# Patient Record
Sex: MALE | Race: WHITE | NOT HISPANIC OR LATINO | URBAN - METROPOLITAN AREA
[De-identification: names, ages, dates, MRNs, and addresses within clinical notes are randomized per-mention and may not be internally consistent; named-entity substitution may affect disease eponyms.]

---

## 2017-01-04 ENCOUNTER — EMERGENCY (EMERGENCY)
Facility: HOSPITAL | Age: 42
LOS: 1 days | Discharge: LEFT BEFORE DISCHARGE | End: 2017-01-04
Attending: EMERGENCY MEDICINE

## 2017-01-04 ENCOUNTER — EMERGENCY (EMERGENCY)
Facility: HOSPITAL | Age: 42
LOS: 1 days | Discharge: HOME | End: 2017-01-04
Attending: EMERGENCY MEDICINE | Admitting: EMERGENCY MEDICINE
Payer: COMMERCIAL

## 2017-01-04 VITALS
SYSTOLIC BLOOD PRESSURE: 149 MMHG | RESPIRATION RATE: 20 BRPM | OXYGEN SATURATION: 97 % | DIASTOLIC BLOOD PRESSURE: 95 MMHG | HEART RATE: 115 BPM | HEIGHT: 71 IN | WEIGHT: 298.06 LBS | TEMPERATURE: 99 F

## 2017-01-04 VITALS
HEART RATE: 118 BPM | SYSTOLIC BLOOD PRESSURE: 147 MMHG | RESPIRATION RATE: 22 BRPM | OXYGEN SATURATION: 99 % | DIASTOLIC BLOOD PRESSURE: 81 MMHG

## 2017-01-04 DIAGNOSIS — R06.02 SHORTNESS OF BREATH: ICD-10-CM

## 2017-01-04 DIAGNOSIS — Z53.20 PROCEDURE AND TREATMENT NOT CARRIED OUT BECAUSE OF PATIENT'S DECISION FOR UNSPECIFIED REASONS: ICD-10-CM

## 2017-01-04 DIAGNOSIS — R05 COUGH: ICD-10-CM

## 2017-01-04 PROCEDURE — 99284 EMERGENCY DEPT VISIT MOD MDM: CPT

## 2017-01-05 VITALS — HEART RATE: 106 BPM | TEMPERATURE: 99 F | RESPIRATION RATE: 18 BRPM | OXYGEN SATURATION: 100 %

## 2017-01-05 LAB
ALBUMIN SERPL ELPH-MCNC: 4.8 G/DL — SIGNIFICANT CHANGE UP (ref 3.3–5)
ALP SERPL-CCNC: 84 U/L — SIGNIFICANT CHANGE UP (ref 40–120)
ALT FLD-CCNC: 60 U/L RC — HIGH (ref 10–45)
ANION GAP SERPL CALC-SCNC: 20 MMOL/L — HIGH (ref 5–17)
APTT BLD: 33.4 SEC — SIGNIFICANT CHANGE UP (ref 27.5–37.4)
AST SERPL-CCNC: 36 U/L — SIGNIFICANT CHANGE UP (ref 10–40)
BASOPHILS # BLD AUTO: 0 K/UL — SIGNIFICANT CHANGE UP (ref 0–0.2)
BASOPHILS NFR BLD AUTO: 0.3 % — SIGNIFICANT CHANGE UP (ref 0–2)
BILIRUB SERPL-MCNC: 0.7 MG/DL — SIGNIFICANT CHANGE UP (ref 0.2–1.2)
BUN SERPL-MCNC: 12 MG/DL — SIGNIFICANT CHANGE UP (ref 7–23)
CALCIUM SERPL-MCNC: 9.4 MG/DL — SIGNIFICANT CHANGE UP (ref 8.4–10.5)
CHLORIDE SERPL-SCNC: 97 MMOL/L — SIGNIFICANT CHANGE UP (ref 96–108)
CO2 SERPL-SCNC: 23 MMOL/L — SIGNIFICANT CHANGE UP (ref 22–31)
CREAT SERPL-MCNC: 1.09 MG/DL — SIGNIFICANT CHANGE UP (ref 0.5–1.3)
EOSINOPHIL # BLD AUTO: 0.3 K/UL — SIGNIFICANT CHANGE UP (ref 0–0.5)
EOSINOPHIL NFR BLD AUTO: 3.4 % — SIGNIFICANT CHANGE UP (ref 0–6)
GLUCOSE SERPL-MCNC: 148 MG/DL — HIGH (ref 70–99)
HCT VFR BLD CALC: 44.9 % — SIGNIFICANT CHANGE UP (ref 39–50)
HGB BLD-MCNC: 15 G/DL — SIGNIFICANT CHANGE UP (ref 13–17)
INR BLD: 1.17 RATIO — HIGH (ref 0.88–1.16)
LYMPHOCYTES # BLD AUTO: 1.7 K/UL — SIGNIFICANT CHANGE UP (ref 1–3.3)
LYMPHOCYTES # BLD AUTO: 20 % — SIGNIFICANT CHANGE UP (ref 13–44)
MCHC RBC-ENTMCNC: 31 PG — SIGNIFICANT CHANGE UP (ref 27–34)
MCHC RBC-ENTMCNC: 33.5 GM/DL — SIGNIFICANT CHANGE UP (ref 32–36)
MCV RBC AUTO: 92.7 FL — SIGNIFICANT CHANGE UP (ref 80–100)
MONOCYTES # BLD AUTO: 1.1 K/UL — HIGH (ref 0–0.9)
MONOCYTES NFR BLD AUTO: 12.3 % — SIGNIFICANT CHANGE UP (ref 2–14)
NEUTROPHILS # BLD AUTO: 5.6 K/UL — SIGNIFICANT CHANGE UP (ref 1.8–7.4)
NEUTROPHILS NFR BLD AUTO: 64 % — SIGNIFICANT CHANGE UP (ref 43–77)
PLATELET # BLD AUTO: 191 K/UL — SIGNIFICANT CHANGE UP (ref 150–400)
POTASSIUM SERPL-MCNC: 3.7 MMOL/L — SIGNIFICANT CHANGE UP (ref 3.5–5.3)
POTASSIUM SERPL-SCNC: 3.7 MMOL/L — SIGNIFICANT CHANGE UP (ref 3.5–5.3)
PROT SERPL-MCNC: 8.2 G/DL — SIGNIFICANT CHANGE UP (ref 6–8.3)
PROTHROM AB SERPL-ACNC: 12.8 SEC — SIGNIFICANT CHANGE UP (ref 10–13.1)
RBC # BLD: 4.84 M/UL — SIGNIFICANT CHANGE UP (ref 4.2–5.8)
RBC # FLD: 12.8 % — SIGNIFICANT CHANGE UP (ref 10.3–14.5)
SODIUM SERPL-SCNC: 140 MMOL/L — SIGNIFICANT CHANGE UP (ref 135–145)
WBC # BLD: 8.7 K/UL — SIGNIFICANT CHANGE UP (ref 3.8–10.5)
WBC # FLD AUTO: 8.7 K/UL — SIGNIFICANT CHANGE UP (ref 3.8–10.5)

## 2017-01-05 PROCEDURE — 80053 COMPREHEN METABOLIC PANEL: CPT

## 2017-01-05 PROCEDURE — 94640 AIRWAY INHALATION TREATMENT: CPT

## 2017-01-05 PROCEDURE — 96374 THER/PROPH/DIAG INJ IV PUSH: CPT

## 2017-01-05 PROCEDURE — 85027 COMPLETE CBC AUTOMATED: CPT

## 2017-01-05 PROCEDURE — 85610 PROTHROMBIN TIME: CPT

## 2017-01-05 PROCEDURE — 71046 X-RAY EXAM CHEST 2 VIEWS: CPT

## 2017-01-05 PROCEDURE — 99284 EMERGENCY DEPT VISIT MOD MDM: CPT | Mod: 25

## 2017-01-05 PROCEDURE — 85730 THROMBOPLASTIN TIME PARTIAL: CPT

## 2017-01-05 PROCEDURE — 71020: CPT | Mod: 26

## 2017-01-05 RX ORDER — ALBUTEROL 90 UG/1
2 AEROSOL, METERED ORAL
Qty: 1 | Refills: 0
Start: 2017-01-05 | End: 2017-02-04

## 2017-01-05 RX ORDER — SODIUM CHLORIDE 9 MG/ML
1000 INJECTION INTRAMUSCULAR; INTRAVENOUS; SUBCUTANEOUS ONCE
Qty: 0 | Refills: 0 | Status: COMPLETED | OUTPATIENT
Start: 2017-01-05 | End: 2017-01-05

## 2017-01-05 RX ORDER — ACETAMINOPHEN 500 MG
975 TABLET ORAL ONCE
Qty: 0 | Refills: 0 | Status: COMPLETED | OUTPATIENT
Start: 2017-01-05 | End: 2017-01-05

## 2017-01-05 RX ORDER — IPRATROPIUM/ALBUTEROL SULFATE 18-103MCG
3 AEROSOL WITH ADAPTER (GRAM) INHALATION ONCE
Qty: 0 | Refills: 0 | Status: COMPLETED | OUTPATIENT
Start: 2017-01-05 | End: 2017-01-05

## 2017-01-05 RX ORDER — IPRATROPIUM/ALBUTEROL SULFATE 18-103MCG
3 AEROSOL WITH ADAPTER (GRAM) INHALATION
Qty: 30 | Refills: 0
Start: 2017-01-05 | End: 2017-02-04

## 2017-01-05 RX ADMIN — Medication 125 MILLIGRAM(S): at 02:44

## 2017-01-05 RX ADMIN — Medication 3 MILLILITER(S): at 02:53

## 2017-01-05 RX ADMIN — Medication 975 MILLIGRAM(S): at 02:44

## 2017-01-05 RX ADMIN — SODIUM CHLORIDE 1000 MILLILITER(S): 9 INJECTION INTRAMUSCULAR; INTRAVENOUS; SUBCUTANEOUS at 02:54

## 2017-01-05 NOTE — ED PROVIDER NOTE - ATTENDING CONTRIBUTION TO CARE
I was physically present for the E/M service provided. I agree with above history, physical, and plan which I have reviewed and edited where appropriate. I was physically present for the key portions of the service provided.    No PNA on CXR  Improved with Duonebs and steroids  comfortable respirations on RA, lungs CTA after Tx  f/u PCP

## 2017-01-05 NOTE — ED PROVIDER NOTE - OBJECTIVE STATEMENT
41M hx of asthma p/w chest tightness and sob typical of prior asthma attacks. Pt has had 4 days of non-productive cough and subjective fever. Used rescue inhaler greater than 1x / 2 hr without relief so presented to ED. Denies recent travel,

## 2017-01-05 NOTE — ED PROVIDER NOTE - RESPIRATORY, MLM
prolonged expiratory phase, LLL wheeze; RLL inspiratory and expiratory wheeze. no increased wob, no accessory muscle use, no stridor.

## 2017-01-05 NOTE — ED PROVIDER NOTE - PROGRESS NOTE DETAILS
Reassess: pt w/o wheeze. ambulating without difficulty. no increased wob. Feels and appears well. No complaints at present. Eager to leave. Stable for discharge.

## 2017-01-05 NOTE — ED PROVIDER NOTE - CARE PLAN
Principal Discharge DX:	Asthma exacerbation  Instructions for follow-up, activity and diet:	Follow up with your PMD within 48-72 hours.  Rest, increase fluids. Take Prednisone 50mg daily for 5 days, Albuterol inhaler 2 inhalations every 4-6 hours as needed. Take all of your other medications as previously prescribed. Any worsening wheezing, shortness of breath, chest pain, fever, chills return to the ED

## 2017-01-05 NOTE — ED PROVIDER NOTE - PLAN OF CARE
Follow up with your PMD within 48-72 hours.  Rest, increase fluids. Take Prednisone 50mg daily for 5 days, Albuterol inhaler 2 inhalations every 4-6 hours as needed. Take all of your other medications as previously prescribed. Any worsening wheezing, shortness of breath, chest pain, fever, chills return to the ED

## 2019-06-14 ENCOUNTER — EMERGENCY (EMERGENCY)
Facility: HOSPITAL | Age: 44
LOS: 1 days | Discharge: HOME | End: 2019-06-14
Attending: EMERGENCY MEDICINE
Payer: COMMERCIAL

## 2019-06-14 VITALS
TEMPERATURE: 99 F | DIASTOLIC BLOOD PRESSURE: 87 MMHG | HEART RATE: 86 BPM | OXYGEN SATURATION: 96 % | SYSTOLIC BLOOD PRESSURE: 131 MMHG

## 2019-06-14 VITALS
HEIGHT: 71 IN | TEMPERATURE: 98 F | HEART RATE: 96 BPM | WEIGHT: 296.08 LBS | SYSTOLIC BLOOD PRESSURE: 124 MMHG | OXYGEN SATURATION: 96 % | RESPIRATION RATE: 20 BRPM | DIASTOLIC BLOOD PRESSURE: 65 MMHG

## 2019-06-14 LAB
ALBUMIN SERPL ELPH-MCNC: 4.2 G/DL — SIGNIFICANT CHANGE UP (ref 3.3–5)
ALP SERPL-CCNC: 87 U/L — SIGNIFICANT CHANGE UP (ref 40–120)
ALT FLD-CCNC: 39 U/L — SIGNIFICANT CHANGE UP (ref 10–45)
ANION GAP SERPL CALC-SCNC: 11 MMOL/L — SIGNIFICANT CHANGE UP (ref 5–17)
APPEARANCE UR: ABNORMAL
APTT BLD: 28.2 SEC — SIGNIFICANT CHANGE UP (ref 27.5–36.3)
AST SERPL-CCNC: 24 U/L — SIGNIFICANT CHANGE UP (ref 10–40)
BACTERIA # UR AUTO: ABNORMAL
BASOPHILS # BLD AUTO: 0 K/UL — SIGNIFICANT CHANGE UP (ref 0–0.2)
BASOPHILS NFR BLD AUTO: 0.3 % — SIGNIFICANT CHANGE UP (ref 0–2)
BILIRUB SERPL-MCNC: 0.3 MG/DL — SIGNIFICANT CHANGE UP (ref 0.2–1.2)
BILIRUB UR-MCNC: NEGATIVE — SIGNIFICANT CHANGE UP
BUN SERPL-MCNC: 12 MG/DL — SIGNIFICANT CHANGE UP (ref 7–23)
C TRACH RRNA SPEC QL NAA+PROBE: SIGNIFICANT CHANGE UP
CALCIUM SERPL-MCNC: 9.4 MG/DL — SIGNIFICANT CHANGE UP (ref 8.4–10.5)
CHLORIDE SERPL-SCNC: 101 MMOL/L — SIGNIFICANT CHANGE UP (ref 96–108)
CO2 SERPL-SCNC: 24 MMOL/L — SIGNIFICANT CHANGE UP (ref 22–31)
COLOR SPEC: ABNORMAL
CREAT SERPL-MCNC: 0.85 MG/DL — SIGNIFICANT CHANGE UP (ref 0.5–1.3)
DIFF PNL FLD: ABNORMAL
EOSINOPHIL # BLD AUTO: 0.2 K/UL — SIGNIFICANT CHANGE UP (ref 0–0.5)
EOSINOPHIL NFR BLD AUTO: 1.2 % — SIGNIFICANT CHANGE UP (ref 0–6)
EPI CELLS # UR: SIGNIFICANT CHANGE UP
GAS PNL BLDV: SIGNIFICANT CHANGE UP
GLUCOSE SERPL-MCNC: 184 MG/DL — HIGH (ref 70–99)
GLUCOSE UR QL: NEGATIVE — SIGNIFICANT CHANGE UP
HCT VFR BLD CALC: 44.1 % — SIGNIFICANT CHANGE UP (ref 39–50)
HGB BLD-MCNC: 14.8 G/DL — SIGNIFICANT CHANGE UP (ref 13–17)
INR BLD: 1 RATIO — SIGNIFICANT CHANGE UP (ref 0.88–1.16)
KETONES UR-MCNC: NEGATIVE — SIGNIFICANT CHANGE UP
LEUKOCYTE ESTERASE UR-ACNC: ABNORMAL
LYMPHOCYTES # BLD AUTO: 1.3 K/UL — SIGNIFICANT CHANGE UP (ref 1–3.3)
LYMPHOCYTES # BLD AUTO: 9.1 % — LOW (ref 13–44)
MCHC RBC-ENTMCNC: 30.6 PG — SIGNIFICANT CHANGE UP (ref 27–34)
MCHC RBC-ENTMCNC: 33.7 GM/DL — SIGNIFICANT CHANGE UP (ref 32–36)
MCV RBC AUTO: 91 FL — SIGNIFICANT CHANGE UP (ref 80–100)
MONOCYTES # BLD AUTO: 0.6 K/UL — SIGNIFICANT CHANGE UP (ref 0–0.9)
MONOCYTES NFR BLD AUTO: 4.5 % — SIGNIFICANT CHANGE UP (ref 2–14)
N GONORRHOEA RRNA SPEC QL NAA+PROBE: SIGNIFICANT CHANGE UP
NEUTROPHILS # BLD AUTO: 12.3 K/UL — HIGH (ref 1.8–7.4)
NEUTROPHILS NFR BLD AUTO: 85 % — HIGH (ref 43–77)
NITRITE UR-MCNC: NEGATIVE — SIGNIFICANT CHANGE UP
PH UR: 5.5 — SIGNIFICANT CHANGE UP (ref 5–8)
PLATELET # BLD AUTO: 214 K/UL — SIGNIFICANT CHANGE UP (ref 150–400)
POTASSIUM SERPL-MCNC: 4.2 MMOL/L — SIGNIFICANT CHANGE UP (ref 3.5–5.3)
POTASSIUM SERPL-SCNC: 4.2 MMOL/L — SIGNIFICANT CHANGE UP (ref 3.5–5.3)
PROT SERPL-MCNC: 7.5 G/DL — SIGNIFICANT CHANGE UP (ref 6–8.3)
PROT UR-MCNC: 100 — SIGNIFICANT CHANGE UP
PROTHROM AB SERPL-ACNC: 11.5 SEC — SIGNIFICANT CHANGE UP (ref 10–12.9)
RBC # BLD: 4.85 M/UL — SIGNIFICANT CHANGE UP (ref 4.2–5.8)
RBC # FLD: 12.4 % — SIGNIFICANT CHANGE UP (ref 10.3–14.5)
RBC CASTS # UR COMP ASSIST: >50 /HPF — SIGNIFICANT CHANGE UP (ref 0–4)
SODIUM SERPL-SCNC: 136 MMOL/L — SIGNIFICANT CHANGE UP (ref 135–145)
SP GR SPEC: 1.02 — SIGNIFICANT CHANGE UP (ref 1.01–1.02)
SPECIMEN SOURCE: SIGNIFICANT CHANGE UP
UROBILINOGEN FLD QL: NEGATIVE — SIGNIFICANT CHANGE UP
WBC # BLD: 14.4 K/UL — HIGH (ref 3.8–10.5)
WBC # FLD AUTO: 14.4 K/UL — HIGH (ref 3.8–10.5)
WBC UR QL: >50

## 2019-06-14 PROCEDURE — 96372 THER/PROPH/DIAG INJ SC/IM: CPT | Mod: XU

## 2019-06-14 PROCEDURE — 82435 ASSAY OF BLOOD CHLORIDE: CPT

## 2019-06-14 PROCEDURE — 99284 EMERGENCY DEPT VISIT MOD MDM: CPT | Mod: 25

## 2019-06-14 PROCEDURE — 87186 SC STD MICRODIL/AGAR DIL: CPT

## 2019-06-14 PROCEDURE — 76870 US EXAM SCROTUM: CPT | Mod: 26

## 2019-06-14 PROCEDURE — 76870 US EXAM SCROTUM: CPT

## 2019-06-14 PROCEDURE — 80053 COMPREHEN METABOLIC PANEL: CPT

## 2019-06-14 PROCEDURE — 74176 CT ABD & PELVIS W/O CONTRAST: CPT

## 2019-06-14 PROCEDURE — 85610 PROTHROMBIN TIME: CPT

## 2019-06-14 PROCEDURE — 84132 ASSAY OF SERUM POTASSIUM: CPT

## 2019-06-14 PROCEDURE — 99285 EMERGENCY DEPT VISIT HI MDM: CPT

## 2019-06-14 PROCEDURE — 87591 N.GONORRHOEAE DNA AMP PROB: CPT

## 2019-06-14 PROCEDURE — 74176 CT ABD & PELVIS W/O CONTRAST: CPT | Mod: 26

## 2019-06-14 PROCEDURE — 93976 VASCULAR STUDY: CPT

## 2019-06-14 PROCEDURE — 87491 CHLMYD TRACH DNA AMP PROBE: CPT

## 2019-06-14 PROCEDURE — 83605 ASSAY OF LACTIC ACID: CPT

## 2019-06-14 PROCEDURE — 81001 URINALYSIS AUTO W/SCOPE: CPT

## 2019-06-14 PROCEDURE — 93975 VASCULAR STUDY: CPT

## 2019-06-14 PROCEDURE — 96374 THER/PROPH/DIAG INJ IV PUSH: CPT

## 2019-06-14 PROCEDURE — 85730 THROMBOPLASTIN TIME PARTIAL: CPT

## 2019-06-14 PROCEDURE — 82803 BLOOD GASES ANY COMBINATION: CPT

## 2019-06-14 PROCEDURE — 87086 URINE CULTURE/COLONY COUNT: CPT

## 2019-06-14 PROCEDURE — 96375 TX/PRO/DX INJ NEW DRUG ADDON: CPT

## 2019-06-14 PROCEDURE — 82947 ASSAY GLUCOSE BLOOD QUANT: CPT

## 2019-06-14 PROCEDURE — 84295 ASSAY OF SERUM SODIUM: CPT

## 2019-06-14 PROCEDURE — 85014 HEMATOCRIT: CPT

## 2019-06-14 PROCEDURE — 93976 VASCULAR STUDY: CPT | Mod: 26

## 2019-06-14 PROCEDURE — 85027 COMPLETE CBC AUTOMATED: CPT

## 2019-06-14 PROCEDURE — 82330 ASSAY OF CALCIUM: CPT

## 2019-06-14 RX ORDER — MORPHINE SULFATE 50 MG/1
4 CAPSULE, EXTENDED RELEASE ORAL ONCE
Refills: 0 | Status: DISCONTINUED | OUTPATIENT
Start: 2019-06-14 | End: 2019-06-14

## 2019-06-14 RX ORDER — KETOROLAC TROMETHAMINE 30 MG/ML
15 SYRINGE (ML) INJECTION ONCE
Refills: 0 | Status: DISCONTINUED | OUTPATIENT
Start: 2019-06-14 | End: 2019-06-14

## 2019-06-14 RX ORDER — SODIUM CHLORIDE 9 MG/ML
1000 INJECTION INTRAMUSCULAR; INTRAVENOUS; SUBCUTANEOUS ONCE
Refills: 0 | Status: COMPLETED | OUTPATIENT
Start: 2019-06-14 | End: 2019-06-14

## 2019-06-14 RX ORDER — CEFTRIAXONE 500 MG/1
250 INJECTION, POWDER, FOR SOLUTION INTRAMUSCULAR; INTRAVENOUS ONCE
Refills: 0 | Status: COMPLETED | OUTPATIENT
Start: 2019-06-14 | End: 2019-06-14

## 2019-06-14 RX ORDER — AZITHROMYCIN 500 MG/1
1 TABLET, FILM COATED ORAL ONCE
Refills: 0 | Status: DISCONTINUED | OUTPATIENT
Start: 2019-06-14 | End: 2019-06-14

## 2019-06-14 RX ORDER — AZITHROMYCIN 500 MG/1
1000 TABLET, FILM COATED ORAL ONCE
Refills: 0 | Status: COMPLETED | OUTPATIENT
Start: 2019-06-14 | End: 2019-06-14

## 2019-06-14 RX ORDER — ACETAMINOPHEN 500 MG
975 TABLET ORAL ONCE
Refills: 0 | Status: COMPLETED | OUTPATIENT
Start: 2019-06-14 | End: 2019-06-14

## 2019-06-14 RX ORDER — CIPROFLOXACIN LACTATE 400MG/40ML
1 VIAL (ML) INTRAVENOUS
Qty: 9 | Refills: 0
Start: 2019-06-14 | End: 2019-06-22

## 2019-06-14 RX ADMIN — Medication 15 MILLIGRAM(S): at 11:32

## 2019-06-14 RX ADMIN — AZITHROMYCIN 1000 MILLIGRAM(S): 500 TABLET, FILM COATED ORAL at 12:20

## 2019-06-14 RX ADMIN — SODIUM CHLORIDE 1000 MILLILITER(S): 9 INJECTION INTRAMUSCULAR; INTRAVENOUS; SUBCUTANEOUS at 09:48

## 2019-06-14 RX ADMIN — Medication 975 MILLIGRAM(S): at 11:33

## 2019-06-14 RX ADMIN — MORPHINE SULFATE 4 MILLIGRAM(S): 50 CAPSULE, EXTENDED RELEASE ORAL at 09:48

## 2019-06-14 RX ADMIN — CEFTRIAXONE 250 MILLIGRAM(S): 500 INJECTION, POWDER, FOR SOLUTION INTRAMUSCULAR; INTRAVENOUS at 12:20

## 2019-06-14 NOTE — ED PROVIDER NOTE - NSFOLLOWUPINSTRUCTIONS_ED_ALL_ED_FT
1. Please follow up with a urologist by calling the listed #. Call today to make earliest available appointment.   2. Take antibiotics as directed for epididymitis as discussed. You received 1 dose today and can start the next dose tomorrow.    3. Take 600mg Motrin (also called Advil or Ibuprofen) every 6 hours as needed for pain/discomfort/swelling. You can take this with Tylenol 650 mg (also called acetaminophen) every 6 hours.     Don't use more than 3500mg of Tylenol in any 24-hour period. Make sure your other prescription/over-the-counter medications don't contain any Tylenol so you don't take too much.     If you have any stomach discomfort while taking Motrin, you can use TUMS or Pepcid or Zantac (these can all be bought without a prescription).   4. Please return to the ED should you have any new or worsening symptoms or have any new concerns.   5. Read all attached.

## 2019-06-14 NOTE — ED PROVIDER NOTE - OBJECTIVE STATEMENT
44 y/o male with PMH asthma p/w R testicular pain. Pain woke him up at 3 am, is sharp, radiates to R lower back. Worse with ambulation. +Dysuria, urinary urgency. +Dark urine today. Pain 10/10. Denies: penile discharge, fevers, chills, n/v/d, HA, dizziness, CP, SOB, trauma, scrotal swelling/rash

## 2019-06-14 NOTE — ED ADULT NURSE NOTE - DISCHARGE DATE/TIME
----- Message from Nicky Gamino sent at 4/3/2019  8:58 AM CDT -----  .Type:  Patient Returning Call    Who Called:pt  Who Left Message for Patient:francois  Does the patient know what this is regarding?:working in 4/9 as she can only do mornings  Would the patient rather a call back or a response via [facility]? call  Best Call Back Number:[phone]   Additional Information: please call until answers, phone acts up sometimes      
14-Jun-2019 12:30

## 2019-06-14 NOTE — ED PROVIDER NOTE - CLINICAL SUMMARY MEDICAL DECISION MAKING FREE TEXT BOX
GAVINO Pruitt MD: 44 y/o male with asthma p/w c/o R testicular pain since 3am, sudden severe, worse with ambulation, +dysuria and urinary urgency. R testicle ++ttp and high-riding. Concern for testicular torsion vs. epididymitis vs. kidney stone. Plan: STAT scrotal US, basic labs, u/a, ucx, pain control. If no evidence of torsion on US, consider CTAP to eval for stone

## 2019-06-14 NOTE — ED PROVIDER NOTE - PROGRESS NOTE DETAILS
Patti Barney DO: reviewed results with patient. Patient monogamous but last STI check 1 year ago. Will treat and obtain g/c results. If positive, girlfriend at bedside aware she should be tested. Will treat with Levoquin x 10 days. Provided with copy of results. Stable for outpatient follow up.

## 2019-06-14 NOTE — ED PROVIDER NOTE - NSFOLLOWUPCLINICS_GEN_ALL_ED_FT
[facility] - Urology  Urology  [address] & 4th [place] [address]  Phone: [phone]  Fax:   Follow Up Time:

## 2019-06-14 NOTE — ED ADULT NURSE NOTE - OBJECTIVE STATEMENT
43 year old male with pmh of fentanyl presenting to ed with sudden onset of suprapubic and right testicular pain at 3am that woke him up out of sleep. patient denies any changes in bowel/bladder, verbalized noticing his urine is darker in color than usual. denies sob/dyspnea/cp/fever/chills. respirations even unlabored

## 2019-06-16 LAB
-  AMIKACIN: SIGNIFICANT CHANGE UP
-  AMPICILLIN/SULBACTAM: SIGNIFICANT CHANGE UP
-  AMPICILLIN: SIGNIFICANT CHANGE UP
-  AZTREONAM: SIGNIFICANT CHANGE UP
-  CEFEPIME: SIGNIFICANT CHANGE UP
-  CEFOXITIN: SIGNIFICANT CHANGE UP
-  CEFTRIAXONE: SIGNIFICANT CHANGE UP
-  CIPROFLOXACIN: SIGNIFICANT CHANGE UP
-  ERTAPENEM: SIGNIFICANT CHANGE UP
-  GENTAMICIN: SIGNIFICANT CHANGE UP
-  IMIPENEM: SIGNIFICANT CHANGE UP
-  LEVOFLOXACIN: SIGNIFICANT CHANGE UP
-  MEROPENEM: SIGNIFICANT CHANGE UP
-  NITROFURANTOIN: SIGNIFICANT CHANGE UP
-  PIPERACILLIN/TAZOBACTAM: SIGNIFICANT CHANGE UP
-  TIGECYCLINE: SIGNIFICANT CHANGE UP
-  TOBRAMYCIN: SIGNIFICANT CHANGE UP
-  TRIMETHOPRIM/SULFAMETHOXAZOLE: SIGNIFICANT CHANGE UP
CULTURE RESULTS: SIGNIFICANT CHANGE UP
METHOD TYPE: SIGNIFICANT CHANGE UP
ORGANISM # SPEC MICROSCOPIC CNT: SIGNIFICANT CHANGE UP
ORGANISM # SPEC MICROSCOPIC CNT: SIGNIFICANT CHANGE UP
SPECIMEN SOURCE: SIGNIFICANT CHANGE UP

## 2020-03-30 ENCOUNTER — EMERGENCY (EMERGENCY)
Facility: HOSPITAL | Age: 45
LOS: 1 days | Discharge: HOME | End: 2020-03-30
Attending: EMERGENCY MEDICINE
Payer: COMMERCIAL

## 2020-03-30 VITALS
RESPIRATION RATE: 24 BRPM | HEART RATE: 93 BPM | DIASTOLIC BLOOD PRESSURE: 66 MMHG | TEMPERATURE: 100 F | SYSTOLIC BLOOD PRESSURE: 103 MMHG | OXYGEN SATURATION: 94 %

## 2020-03-30 VITALS
WEIGHT: 279.99 LBS | TEMPERATURE: 103 F | OXYGEN SATURATION: 95 % | DIASTOLIC BLOOD PRESSURE: 74 MMHG | SYSTOLIC BLOOD PRESSURE: 110 MMHG | RESPIRATION RATE: 18 BRPM | HEIGHT: 69 IN | HEART RATE: 126 BPM

## 2020-03-30 PROCEDURE — 99284 EMERGENCY DEPT VISIT MOD MDM: CPT

## 2020-03-30 PROCEDURE — 71045 X-RAY EXAM CHEST 1 VIEW: CPT | Mod: 26

## 2020-03-30 PROCEDURE — 99283 EMERGENCY DEPT VISIT LOW MDM: CPT | Mod: 25

## 2020-03-30 PROCEDURE — 71045 X-RAY EXAM CHEST 1 VIEW: CPT

## 2020-03-30 PROCEDURE — 93005 ELECTROCARDIOGRAM TRACING: CPT

## 2020-03-30 PROCEDURE — 93010 ELECTROCARDIOGRAM REPORT: CPT

## 2020-03-30 RX ORDER — ACETAMINOPHEN 500 MG
975 TABLET ORAL ONCE
Refills: 0 | Status: COMPLETED | OUTPATIENT
Start: 2020-03-30 | End: 2020-03-30

## 2020-03-30 RX ADMIN — Medication 975 MILLIGRAM(S): at 17:51

## 2020-03-30 NOTE — ED ADULT NURSE NOTE - OBJECTIVE STATEMENT
The pt is a 45 y/o M no PMH c/o fever, cough, SOB x 1 week. Pt has been taking PM Tylenol everyday. Upon assessment, pt is AO x 4, speaking coherently in full sentences, skin warm, dry and pink. Pt resting comfortably, will continue to monitor.

## 2020-03-30 NOTE — ED PROVIDER NOTE - NSFOLLOWUPINSTRUCTIONS_ED_ALL_ED_FT
You were seen and evaluated in the Emergency Department for your viral upper respiratory-like symptoms. You were evaluated clinically and with laboratory studies - at this time your symptoms, history, and presentation do not suggest that you are at high-risk for Corona Virus (COVID). We have sent off a respiratory viral panel for further evaluation of your illness. You can call back for your results by [phone] of your viral panel; please see the attached COVID information packet for management of your symptoms as well as numbers to call for your COVID test results, and more information on the next steps including your self-quarantine measures.     At this time your clinical evaluation and history do not demonstrate any acute, life-threatening medical conditions warranting emergent treatment. However, we strongly recommend you follow up with one of our Primary Care Doctors (or your own) for further evaluation of your symptoms by calling the following number to make an appointment:    [facility] Internal Medicine  General Internal Medicine  [address] [place] [address]  Phone: [phone]    Continue to maintain oral hydration, with plenty of fluids; take Tylenol and Motrin, every 8 hours with food (following the instructions on the medication insert information sheet for dosing) for fever control.     Should you develop new or worsening chest pain, shortness of breath, abdominal pain, fevers, chills, nausea, vomiting, diarrhea, or constipation - please return to the ED for immediate evaluation.     We also strongly encourage you make an appointment with your Primary Care Physician for a comprehensive evaluation of your health.

## 2020-03-30 NOTE — ED PROVIDER NOTE - PATIENT PORTAL LINK FT
You can access the FollowMyHealth Patient Portal offered by [facility] by registering at the following website: [email or web address]. By joining [facility]’s FollowMyHealth portal, you will also be able to view your health information using other applications (apps) compatible with our system.

## 2020-03-30 NOTE — ED PROVIDER NOTE - OBJECTIVE STATEMENT
44 y.o. male history of asthma (never been intubated, not on any prolonged agents) coming in on day 5 of fevers, chills, cough ()dry) and SoB.  No one else is sick at home.  Has been self isolating.  Taking Tylenol at home for relief of fever but has not taken anything today.  No belly pain but some decreased PO.  No vomiting, no diarrhea.  No chest pain.  Exertion makes his symptoms worse, rest makes the SoB better.

## 2020-03-30 NOTE — ED PROVIDER NOTE - NSFOLLOWUPCLINICS_GEN_ALL_ED_FT
[facility] General Internal Medicine  General Internal Medicine  [address] [id number]  Phone: [phone]  Fax:   Follow Up Time:

## 2020-03-30 NOTE — ED PROVIDER NOTE - ATTENDING CONTRIBUTION TO CARE
43yo male pmh mild intermittent asthma no intubations p/w fevers x 3d, myalgias and joint pains, weakness, cough, rhinorrhea x 8d. Daughter with cough x 2d. Denies Tylenol or ibuprofen today. Denies rash, recent travel, vomiting, confusion, syncope. Pt appears tired, AAOx4, able to sit on his own, diminished breath sounds bilaterally, no wheezes or ronchi, not tachypneic. Abd soft, HR tachycardic no murmur, regular rate, no calf tenderness or LE swelling, moving all extremities. Tylenol, CXR. EKG sinus tachycardic non ischemic. Oral hydration. Reassess.

## 2020-04-01 ENCOUNTER — INPATIENT (INPATIENT)
Facility: HOSPITAL | Age: 45
LOS: 7 days | Discharge: HOME | DRG: 177 | End: 2020-04-09
Attending: INTERNAL MEDICINE | Admitting: HOSPITALIST
Payer: COMMERCIAL

## 2020-04-01 VITALS
WEIGHT: 279.99 LBS | DIASTOLIC BLOOD PRESSURE: 81 MMHG | HEART RATE: 106 BPM | HEIGHT: 69 IN | SYSTOLIC BLOOD PRESSURE: 145 MMHG | OXYGEN SATURATION: 93 % | TEMPERATURE: 101 F | RESPIRATION RATE: 25 BRPM

## 2020-04-01 DIAGNOSIS — Z29.9 ENCOUNTER FOR PROPHYLACTIC MEASURES, UNSPECIFIED: ICD-10-CM

## 2020-04-01 DIAGNOSIS — R68.89 OTHER GENERAL SYMPTOMS AND SIGNS: ICD-10-CM

## 2020-04-01 DIAGNOSIS — J12.9 VIRAL PNEUMONIA, UNSPECIFIED: ICD-10-CM

## 2020-04-01 DIAGNOSIS — J45.40 MODERATE PERSISTENT ASTHMA, UNCOMPLICATED: ICD-10-CM

## 2020-04-01 LAB
ALBUMIN SERPL ELPH-MCNC: 4.1 G/DL — SIGNIFICANT CHANGE UP (ref 3.3–5)
ALP SERPL-CCNC: 59 U/L — SIGNIFICANT CHANGE UP (ref 40–120)
ALT FLD-CCNC: 34 U/L — SIGNIFICANT CHANGE UP (ref 10–45)
ANION GAP SERPL CALC-SCNC: 14 MMOL/L — SIGNIFICANT CHANGE UP (ref 5–17)
APTT BLD: 29.3 SEC — SIGNIFICANT CHANGE UP (ref 27.5–36.3)
AST SERPL-CCNC: 51 U/L — HIGH (ref 10–40)
BASE EXCESS BLDV CALC-SCNC: 1.8 MMOL/L — SIGNIFICANT CHANGE UP (ref -2–2)
BASOPHILS # BLD AUTO: 0.01 K/UL — SIGNIFICANT CHANGE UP (ref 0–0.2)
BASOPHILS NFR BLD AUTO: 0.2 % — SIGNIFICANT CHANGE UP (ref 0–2)
BILIRUB SERPL-MCNC: 0.7 MG/DL — SIGNIFICANT CHANGE UP (ref 0.2–1.2)
BUN SERPL-MCNC: 11 MG/DL — SIGNIFICANT CHANGE UP (ref 7–23)
CA-I SERPL-SCNC: SIGNIFICANT CHANGE UP MMOL/L (ref 1.12–1.3)
CALCIUM SERPL-MCNC: 8.9 MG/DL — SIGNIFICANT CHANGE UP (ref 8.4–10.5)
CHLORIDE BLDV-SCNC: 98 MMOL/L — SIGNIFICANT CHANGE UP (ref 96–108)
CHLORIDE SERPL-SCNC: 94 MMOL/L — LOW (ref 96–108)
CO2 BLDV-SCNC: 29 MMOL/L — SIGNIFICANT CHANGE UP (ref 22–30)
CO2 SERPL-SCNC: 25 MMOL/L — SIGNIFICANT CHANGE UP (ref 22–31)
CREAT SERPL-MCNC: 0.79 MG/DL — SIGNIFICANT CHANGE UP (ref 0.5–1.3)
CRP SERPL-MCNC: 4.6 MG/DL — HIGH (ref 0–0.4)
D DIMER BLD IA.RAPID-MCNC: 250 NG/ML DDU — HIGH
EOSINOPHIL # BLD AUTO: 0 K/UL — SIGNIFICANT CHANGE UP (ref 0–0.5)
EOSINOPHIL NFR BLD AUTO: 0 % — SIGNIFICANT CHANGE UP (ref 0–6)
ERYTHROCYTE [SEDIMENTATION RATE] IN BLOOD: 46 MM/HR — HIGH (ref 0–15)
FERRITIN SERPL-MCNC: 1282 NG/ML — HIGH (ref 30–400)
GAS PNL BLDV: SIGNIFICANT CHANGE UP
GAS PNL BLDV: SIGNIFICANT CHANGE UP
GAS PNL BLDV: SIGNIFICANT CHANGE UP MMOL/L (ref 135–145)
GLUCOSE BLDV-MCNC: 88 MG/DL — SIGNIFICANT CHANGE UP (ref 70–99)
GLUCOSE SERPL-MCNC: 91 MG/DL — SIGNIFICANT CHANGE UP (ref 70–99)
HCO3 BLDV-SCNC: 27 MMOL/L — SIGNIFICANT CHANGE UP (ref 21–29)
HCT VFR BLD CALC: 43.7 % — SIGNIFICANT CHANGE UP (ref 39–50)
HCT VFR BLDA CALC: 55 % — HIGH (ref 39–50)
HGB BLD CALC-MCNC: 18.1 G/DL — HIGH (ref 13–17)
HGB BLD-MCNC: 14.4 G/DL — SIGNIFICANT CHANGE UP (ref 13–17)
HOROWITZ INDEX BLDV+IHG-RTO: SIGNIFICANT CHANGE UP
IMM GRANULOCYTES NFR BLD AUTO: 0.5 % — SIGNIFICANT CHANGE UP (ref 0–1.5)
INR BLD: 1.16 RATIO — SIGNIFICANT CHANGE UP (ref 0.88–1.16)
LACTATE BLDV-MCNC: 1.7 MMOL/L — SIGNIFICANT CHANGE UP (ref 0.7–2)
LDH SERPL L TO P-CCNC: 625 U/L — HIGH (ref 50–242)
LYMPHOCYTES # BLD AUTO: 0.67 K/UL — LOW (ref 1–3.3)
LYMPHOCYTES # BLD AUTO: 15.7 % — SIGNIFICANT CHANGE UP (ref 13–44)
MCHC RBC-ENTMCNC: 29 PG — SIGNIFICANT CHANGE UP (ref 27–34)
MCHC RBC-ENTMCNC: 33 GM/DL — SIGNIFICANT CHANGE UP (ref 32–36)
MCV RBC AUTO: 87.9 FL — SIGNIFICANT CHANGE UP (ref 80–100)
MONOCYTES # BLD AUTO: 0.46 K/UL — SIGNIFICANT CHANGE UP (ref 0–0.9)
MONOCYTES NFR BLD AUTO: 10.7 % — SIGNIFICANT CHANGE UP (ref 2–14)
NEUTROPHILS # BLD AUTO: 3.12 K/UL — SIGNIFICANT CHANGE UP (ref 1.8–7.4)
NEUTROPHILS NFR BLD AUTO: 72.9 % — SIGNIFICANT CHANGE UP (ref 43–77)
NRBC # BLD: 0 /100 WBCS — SIGNIFICANT CHANGE UP (ref 0–0)
PCO2 BLDV: 48 MMHG — SIGNIFICANT CHANGE UP (ref 35–50)
PH BLDV: 7.38 — SIGNIFICANT CHANGE UP (ref 7.35–7.45)
PLATELET # BLD AUTO: 145 K/UL — LOW (ref 150–400)
PO2 BLDV: 30 MMHG — SIGNIFICANT CHANGE UP (ref 25–45)
POTASSIUM BLDV-SCNC: SIGNIFICANT CHANGE UP MMOL/L (ref 3.5–5.3)
POTASSIUM SERPL-MCNC: 4.7 MMOL/L — SIGNIFICANT CHANGE UP (ref 3.5–5.3)
POTASSIUM SERPL-SCNC: 4.7 MMOL/L — SIGNIFICANT CHANGE UP (ref 3.5–5.3)
PROCALCITONIN SERPL-MCNC: 0.13 NG/ML — HIGH (ref 0.02–0.1)
PROT SERPL-MCNC: 7.7 G/DL — SIGNIFICANT CHANGE UP (ref 6–8.3)
PROTHROM AB SERPL-ACNC: 13.4 SEC — HIGH (ref 10–12.9)
RBC # BLD: 4.97 M/UL — SIGNIFICANT CHANGE UP (ref 4.2–5.8)
RBC # FLD: 12.6 % — SIGNIFICANT CHANGE UP (ref 10.3–14.5)
SAO2 % BLDV: 53 % — LOW (ref 67–88)
SODIUM SERPL-SCNC: 133 MMOL/L — LOW (ref 135–145)
TROPONIN T, HIGH SENSITIVITY RESULT: 10 NG/L — SIGNIFICANT CHANGE UP (ref 0–51)
WBC # BLD: 4.28 K/UL — SIGNIFICANT CHANGE UP (ref 3.8–10.5)
WBC # FLD AUTO: 4.28 K/UL — SIGNIFICANT CHANGE UP (ref 3.8–10.5)

## 2020-04-01 PROCEDURE — 71045 X-RAY EXAM CHEST 1 VIEW: CPT | Mod: 26

## 2020-04-01 PROCEDURE — 99285 EMERGENCY DEPT VISIT HI MDM: CPT

## 2020-04-01 PROCEDURE — 99223 1ST HOSP IP/OBS HIGH 75: CPT

## 2020-04-01 PROCEDURE — 93010 ELECTROCARDIOGRAM REPORT: CPT

## 2020-04-01 RX ORDER — IPRATROPIUM BROMIDE 0.2 MG/ML
1 SOLUTION, NON-ORAL INHALATION ONCE
Refills: 0 | Status: DISCONTINUED | OUTPATIENT
Start: 2020-04-01 | End: 2020-04-01

## 2020-04-01 RX ORDER — ACETAMINOPHEN 500 MG
1000 TABLET ORAL ONCE
Refills: 0 | Status: COMPLETED | OUTPATIENT
Start: 2020-04-01 | End: 2020-04-01

## 2020-04-01 RX ORDER — ALBUTEROL 90 UG/1
2 AEROSOL, METERED ORAL ONCE
Refills: 0 | Status: COMPLETED | OUTPATIENT
Start: 2020-04-01 | End: 2020-04-01

## 2020-04-01 RX ORDER — GUAIFENESIN/DEXTROMETHORPHAN 600MG-30MG
10 TABLET, EXTENDED RELEASE 12 HR ORAL EVERY 4 HOURS
Refills: 0 | Status: DISCONTINUED | OUTPATIENT
Start: 2020-04-01 | End: 2020-04-02

## 2020-04-01 RX ORDER — HYDROXYCHLOROQUINE SULFATE 200 MG
200 TABLET ORAL EVERY 12 HOURS
Refills: 0 | Status: COMPLETED | OUTPATIENT
Start: 2020-04-02 | End: 2020-04-06

## 2020-04-01 RX ORDER — ACETAMINOPHEN 500 MG
650 TABLET ORAL ONCE
Refills: 0 | Status: COMPLETED | OUTPATIENT
Start: 2020-04-01 | End: 2020-04-01

## 2020-04-01 RX ORDER — HYDROXYCHLOROQUINE SULFATE 200 MG
TABLET ORAL
Refills: 0 | Status: COMPLETED | OUTPATIENT
Start: 2020-04-01 | End: 2020-04-06

## 2020-04-01 RX ORDER — ACETAMINOPHEN 500 MG
2 TABLET ORAL
Qty: 0 | Refills: 0 | DISCHARGE

## 2020-04-01 RX ORDER — ENOXAPARIN SODIUM 100 MG/ML
40 INJECTION SUBCUTANEOUS EVERY 12 HOURS
Refills: 0 | Status: DISCONTINUED | OUTPATIENT
Start: 2020-04-01 | End: 2020-04-09

## 2020-04-01 RX ORDER — ENOXAPARIN SODIUM 100 MG/ML
40 INJECTION SUBCUTANEOUS DAILY
Refills: 0 | Status: DISCONTINUED | OUTPATIENT
Start: 2020-04-01 | End: 2020-04-01

## 2020-04-01 RX ORDER — ERGOCALCIFEROL 1.25 MG/1
1 CAPSULE ORAL
Qty: 0 | Refills: 0 | DISCHARGE

## 2020-04-01 RX ORDER — ACETAMINOPHEN 500 MG
1000 TABLET ORAL EVERY 8 HOURS
Refills: 0 | Status: DISCONTINUED | OUTPATIENT
Start: 2020-04-01 | End: 2020-04-09

## 2020-04-01 RX ORDER — ALBUTEROL 90 UG/1
2 AEROSOL, METERED ORAL EVERY 6 HOURS
Refills: 0 | Status: DISCONTINUED | OUTPATIENT
Start: 2020-04-01 | End: 2020-04-09

## 2020-04-01 RX ORDER — ALBUTEROL 90 UG/1
0 AEROSOL, METERED ORAL
Qty: 0 | Refills: 0 | DISCHARGE

## 2020-04-01 RX ORDER — HYDROXYCHLOROQUINE SULFATE 200 MG
400 TABLET ORAL EVERY 12 HOURS
Refills: 0 | Status: COMPLETED | OUTPATIENT
Start: 2020-04-01 | End: 2020-04-02

## 2020-04-01 RX ADMIN — Medication 650 MILLIGRAM(S): at 11:59

## 2020-04-01 RX ADMIN — ALBUTEROL 2 PUFF(S): 90 AEROSOL, METERED ORAL at 12:02

## 2020-04-01 RX ADMIN — Medication 400 MILLIGRAM(S): at 22:46

## 2020-04-01 RX ADMIN — Medication 400 MILLIGRAM(S): at 20:27

## 2020-04-01 RX ADMIN — ALBUTEROL 2 PUFF(S): 90 AEROSOL, METERED ORAL at 23:47

## 2020-04-01 NOTE — H&P ADULT - HISTORY OF PRESENT ILLNESS
Mr. Samuels is a 44 year old man with a PMHx of moderate intermittent asthma (2 prior hospitalizations, no history of intubations), morbid obesity, questionable history of pre-diabetes per patient presenting from home for evaluation of worsening shortness of breath. The patient states that his symptoms began approximately 7 days ago with cough, chest congestion/discomfort, fever and myalgias. He presented to our ED approximately 2 days ago with those complaints. At that time he was febrile and had CXR findings concerning for coronavirus infection, but was discharged home with stable oxygen levels. At home, he has tried courses of azithromycin and prednisone (50 mg x 5 days) that he had left over from prior illnesses, but without improvement in symptoms. He presented to the ED today with worsening dyspnea on exertion, stating he gets short of breath simply by moving around or taking a few steps. He reports the sensation of mucus in his chest, but unable to cough up. He reports no recent travel or sick contacts.

## 2020-04-01 NOTE — ED PROVIDER NOTE - PHYSICAL EXAMINATION
General appearance: diaphoretic, mildly sob, febrile    Eyes: anicteric sclerae, moist conjunctivae   HENT: Atraumatic; oropharynx clear with dry mucous membranes and no mucosal ulcerations; normal hard and soft palate; mild pharyngeal erythema   Neck: Trachea midline; Full range of motion, supple   Pulm: Lungs clear to auscultation bilaterally, mildly increased wob   CV: Regular Rhythm and Rate; Normal S1, S2; No murmurs, rubs, or gallops   Abdomen: Soft, non-tender, non-distended   Extremities: No peripheral edema   Skin: Normal temperature, turgor and texture; no rash, ulcers or subcutaneous nodules   Psych: Appropriate affect, cooperative; alert and oriented to person, place and time General appearance: diaphoretic, mildly sob, febrile    Eyes: anicteric sclerae, moist conjunctivae   HENT: Atraumatic; oropharynx clear with dry mucous membranes and no mucosal ulcerations; normal hard and soft palate; mild pharyngeal erythema   Neck: Trachea midline; Full range of motion, supple   Pulm: Lungs clear to auscultation bilaterally, mildly increased wob   CV: Regular Rhythm and Rate; Normal S1, S2; No murmurs, rubs, or gallops   Abdomen: Soft, non-tender, non-distended   Extremities: No peripheral edema   Skin: Normal temperature, turgor and texture; no rash, ulcers or subcutaneous nodules   Psych: Appropriate affect, cooperative; alert and oriented to person, place and time  Attn - alert, ill appearing.  T 101, Sat 95%  dry mm, lungs - poor air entry bilat, clear, cor - rr, tachy, abdo - obese, soft, NT, ND, no CVAT, neuro-

## 2020-04-01 NOTE — ED PROVIDER NOTE - NS ED ROS FT
General:  +fever, chills denies weight loss/weight gain.  HENT: +nasal congestion, sore throat, rhinorrhea denies ear pain  Eyes: denies visual changes, blurred vision  Neck: denies neck pain  CV: +chest pain denies palpitations  Resp: +difficulty breathing, cough  Abdominal: +nausea, diarrhea, abdominal pain denies vomiting   MSK: +muscle aches  Neuro: denies headaches, numbness, tingling, dizziness, lightheadedness.  Skin: denies rashes, cuts, bruises  Hematologic: denies unexplained bruises

## 2020-04-01 NOTE — ED PROVIDER NOTE - CLINICAL SUMMARY MEDICAL DECISION MAKING FREE TEXT BOX
43 y/o M w/ Mhx of asthma returns to ED w/ diff breathing satting 93% on RA s/p 9 days of flu like sx likely 2/2 COVID. Labs, CXR, COVID. Will admit.

## 2020-04-01 NOTE — H&P ADULT - PROBLEM SELECTOR PLAN 1
secondary to likely COVID-19 infection with mild hypoxia  1. Admit to medicine.  2. Supportive care with tylenol PRN fever, robitussin/tessalon PRN for cough.  3. Given hypoxia and normal QTc, will start plaquenil course, as high likelihood of positive PCR. Will follow-up final result.  4. Defer addition of antibiotics at this time; doubt bacterial superinfection  5. Albuterol MDI PRN for wheezing  6. Incentive spirometry  7. O2 support via NC (2L at moment). Intermittent pulse oximetry. Titrate to goal O2 sat 95%

## 2020-04-01 NOTE — ED ADULT NURSE NOTE - NSIMPLEMENTINTERV_GEN_ALL_ED
Implemented All Universal Safety Interventions:  Star to call system. Call bell, personal items and telephone within reach. Instruct patient to call for assistance. Room bathroom lighting operational. Non-slip footwear when patient is off stretcher. Physically safe environment: no spills, clutter or unnecessary equipment. Stretcher in lowest position, wheels locked, appropriate side rails in place.

## 2020-04-01 NOTE — ED PROVIDER NOTE - ATTENDING CONTRIBUTION TO CARE
I performed a history and physical exam of the patient and discussed their management with the resident/ACP/medical or PA student. I reviewed the resident/ACP/medical or PA student's note and agree with the documented findings and plan of care.  attn - see MDM

## 2020-04-01 NOTE — H&P ADULT - GASTROINTESTINAL DETAILS
soft/nontender/no rigidity/no distention/no rebound tenderness/bowel sounds normal/no masses palpable/no guarding

## 2020-04-01 NOTE — H&P ADULT - NSHPLABSRESULTS_GEN_ALL_CORE
Labs reviewed as below.    CXR personally reviewed: Bilateral patchy airspace opacities    EKG personally reviewed: Sinus tachycardia with QTc WNL

## 2020-04-01 NOTE — ED PROVIDER NOTE - CARE PLAN
Principal Discharge DX:	Difficulty breathing Principal Discharge DX:	[facility]

## 2020-04-01 NOTE — H&P ADULT - NSHPPHYSICALEXAM_GEN_ALL_CORE
Independent exam performed by myself.    Vital Signs Last 24 Hrs  T(C): 37.9 (01 Apr 2020 17:15), Max: 38.4 (01 Apr 2020 11:51)  T(F): 100.2 (01 Apr 2020 17:15), Max: 101.2 (01 Apr 2020 11:51)  HR: 99 (01 Apr 2020 17:15) (97 - 106)  BP: 125/75 (01 Apr 2020 17:15) (119/79 - 145/81)  BP(mean): --  RR: 20 (01 Apr 2020 17:15) (20 - 25)  SpO2: 96% (01 Apr 2020 17:15) (93% - 97%)

## 2020-04-01 NOTE — H&P ADULT - ASSESSMENT
Mr. Samuels is a 44 year old man with a PMHx of moderate intermittent asthma (2 prior hospitalizations, no history of intubations), morbid obesity, questionable history of pre-diabetes per patient presenting from home for evaluation of worsening shortness of breath, found to have bilateral airspace opacities on CXR, fever, and lymphopenia, concerning for COVID-19 infection with mild hypoxia.

## 2020-04-01 NOTE — ED PROVIDER NOTE - OBJECTIVE STATEMENT
43 y/o M w/ Mhx of asthma (no intubations)  p/w difficulty breathing x 1 day. Patient states that he developed flu like sx including muscle/joint pain, nausea, diarrhea, fever, chills, night sweats 11 days ago which have been worsening over the past week. Patient was seen in the ED x 3 days ago w/a cxr showing b/l airspace opacities. No vape/smoke history. 43 y/o M w/ Mhx of asthma (no intubations)  p/w difficulty breathing x 1 day. Patient states that he developed flu like sx including muscle/joint pain, nausea, diarrhea, fever, chills, night sweats 11 days ago which have been worsening over the past week. Patient was seen in the ED x 3 days ago w/a cxr showing b/l airspace opacities. No vape/smoke history.  Attn - pt seen in Or[facility] - agree with above - pt ill x 11 days with worsening breathing x 2-3 days with severe harry.  pt con't fever, anosmia, diarrhea. prior hx of PN requiring admission for 1-2 weeks x 2.  Borderline DM.  Pt was seen in ER 3 days ago with fever 104 and CXR=bilat PN.

## 2020-04-01 NOTE — ED ADULT NURSE NOTE - OBJECTIVE STATEMENT
44y male with hx of asthma and DM presents to the ER c/o shortness of breath. pt is alert and oriented x 4 and speaking coherently. pt was seen in ER on monday and dc home with suspected covid. pt coming back today for worsening sob. pt satting 93% on RA, placed on 2L NC in triage and states that helped with his sob. pt states he was using his inhaler at home, and only made his sob worse, he states he felt sharp pains in his Lungs after using the inhaler. at rest respirations are non labored. pt febrile in the ER to 101.2. IVL placed. pt c/o nausea, diarrhea, lower back pain and sob. MD spenec completed. will reassess.

## 2020-04-01 NOTE — ED PROVIDER NOTE - PROGRESS NOTE DETAILS
Elijah PGY1- LDH, D Dimer, pro zari elevated. CXR still w/ bilateral airspace opacities. Seems to have increasing LLL opacity from prior cxr. Hospitalist paged. Will admit.

## 2020-04-01 NOTE — H&P ADULT - ATTENDING COMMENTS
-Pneumonia due to COVID-19  -J12.89: Other viral pneumonia, and  -B97.29 Other coronavirus as the cause of diseases classified elsewhere    CPT: 91986

## 2020-04-01 NOTE — H&P ADULT - PROBLEM SELECTOR PLAN 2
1. Given lack of wheezing on exam and prior course of prednisone taken at home (without improvement in symptoms), will defer initiation of systemic corticosteroids at this time.  2. Hold inhaled corticosteroids as well as may increase viral shedding  3. Albuterol q4h via MDI; avoid nebulizers  4. If wheezing presents or symptoms not improving, will obtain pulmonary evaluation

## 2020-04-01 NOTE — H&P ADULT - PROBLEM SELECTOR PLAN 3
1. Obtain HbA1c measurement in AM for screening purposes. Reports poor f/u with PMD.  2. Weight loss counseling

## 2020-04-01 NOTE — ED PROVIDER NOTE - ADDITIONAL RISK FACTOR FREE TEXT BOX
Attn - suspected COVID-19 with +CXR with bilat PN, now with worsening breathing, sob, harry.  O2 Supplement, repeat CXR, swab for covid, admit.

## 2020-04-02 LAB
ALBUMIN SERPL ELPH-MCNC: 4 G/DL — SIGNIFICANT CHANGE UP (ref 3.3–5)
ALP SERPL-CCNC: 63 U/L — SIGNIFICANT CHANGE UP (ref 40–120)
ALT FLD-CCNC: 29 U/L — SIGNIFICANT CHANGE UP (ref 10–45)
ANION GAP SERPL CALC-SCNC: 18 MMOL/L — HIGH (ref 5–17)
AST SERPL-CCNC: 31 U/L — SIGNIFICANT CHANGE UP (ref 10–40)
BASOPHILS # BLD AUTO: 0.01 K/UL — SIGNIFICANT CHANGE UP (ref 0–0.2)
BASOPHILS NFR BLD AUTO: 0.2 % — SIGNIFICANT CHANGE UP (ref 0–2)
BILIRUB SERPL-MCNC: 0.7 MG/DL — SIGNIFICANT CHANGE UP (ref 0.2–1.2)
BUN SERPL-MCNC: 11 MG/DL — SIGNIFICANT CHANGE UP (ref 7–23)
CALCIUM SERPL-MCNC: 8.7 MG/DL — SIGNIFICANT CHANGE UP (ref 8.4–10.5)
CHLORIDE SERPL-SCNC: 93 MMOL/L — LOW (ref 96–108)
CO2 SERPL-SCNC: 22 MMOL/L — SIGNIFICANT CHANGE UP (ref 22–31)
CREAT SERPL-MCNC: 0.79 MG/DL — SIGNIFICANT CHANGE UP (ref 0.5–1.3)
EOSINOPHIL # BLD AUTO: 0.01 K/UL — SIGNIFICANT CHANGE UP (ref 0–0.5)
EOSINOPHIL NFR BLD AUTO: 0.2 % — SIGNIFICANT CHANGE UP (ref 0–6)
ESTIMATED AVERAGE GLUCOSE: 140 MG/DL — HIGH (ref 68–114)
GLUCOSE SERPL-MCNC: 84 MG/DL — SIGNIFICANT CHANGE UP (ref 70–99)
HBA1C BLD-MCNC: 6.5 % — HIGH (ref 4–5.6)
HBA1C BLD-MCNC: 6.5 % — HIGH (ref 4–5.6)
HCT VFR BLD CALC: 45.3 % — SIGNIFICANT CHANGE UP (ref 39–50)
HGB BLD-MCNC: 15.1 G/DL — SIGNIFICANT CHANGE UP (ref 13–17)
IMM GRANULOCYTES NFR BLD AUTO: 0.2 % — SIGNIFICANT CHANGE UP (ref 0–1.5)
LYMPHOCYTES # BLD AUTO: 0.84 K/UL — LOW (ref 1–3.3)
LYMPHOCYTES # BLD AUTO: 16.7 % — SIGNIFICANT CHANGE UP (ref 13–44)
MCHC RBC-ENTMCNC: 29.2 PG — SIGNIFICANT CHANGE UP (ref 27–34)
MCHC RBC-ENTMCNC: 33.3 GM/DL — SIGNIFICANT CHANGE UP (ref 32–36)
MCV RBC AUTO: 87.6 FL — SIGNIFICANT CHANGE UP (ref 80–100)
MONOCYTES # BLD AUTO: 0.47 K/UL — SIGNIFICANT CHANGE UP (ref 0–0.9)
MONOCYTES NFR BLD AUTO: 9.3 % — SIGNIFICANT CHANGE UP (ref 2–14)
NEUTROPHILS # BLD AUTO: 3.7 K/UL — SIGNIFICANT CHANGE UP (ref 1.8–7.4)
NEUTROPHILS NFR BLD AUTO: 73.4 % — SIGNIFICANT CHANGE UP (ref 43–77)
NRBC # BLD: 0 /100 WBCS — SIGNIFICANT CHANGE UP (ref 0–0)
PLATELET # BLD AUTO: 145 K/UL — LOW (ref 150–400)
POTASSIUM SERPL-MCNC: 3.6 MMOL/L — SIGNIFICANT CHANGE UP (ref 3.5–5.3)
POTASSIUM SERPL-SCNC: 3.6 MMOL/L — SIGNIFICANT CHANGE UP (ref 3.5–5.3)
PROT SERPL-MCNC: 7.3 G/DL — SIGNIFICANT CHANGE UP (ref 6–8.3)
RBC # BLD: 5.17 M/UL — SIGNIFICANT CHANGE UP (ref 4.2–5.8)
RBC # FLD: 12.8 % — SIGNIFICANT CHANGE UP (ref 10.3–14.5)
SARS-COV-2 RNA SPEC QL NAA+PROBE: DETECTED
SODIUM SERPL-SCNC: 133 MMOL/L — LOW (ref 135–145)
WBC # BLD: 5.04 K/UL — SIGNIFICANT CHANGE UP (ref 3.8–10.5)
WBC # FLD AUTO: 5.04 K/UL — SIGNIFICANT CHANGE UP (ref 3.8–10.5)

## 2020-04-02 RX ORDER — ASCORBIC ACID 60 MG
1500 TABLET,CHEWABLE ORAL DAILY
Refills: 0 | Status: COMPLETED | OUTPATIENT
Start: 2020-04-02 | End: 2020-04-06

## 2020-04-02 RX ORDER — ZINC SULFATE TAB 220 MG (50 MG ZINC EQUIVALENT) 220 (50 ZN) MG
220 TAB ORAL DAILY
Refills: 0 | Status: COMPLETED | OUTPATIENT
Start: 2020-04-02 | End: 2020-04-06

## 2020-04-02 RX ORDER — LANOLIN ALCOHOL/MO/W.PET/CERES
3 CREAM (GRAM) TOPICAL ONCE
Refills: 0 | Status: COMPLETED | OUTPATIENT
Start: 2020-04-02 | End: 2020-04-02

## 2020-04-02 RX ORDER — GUAIFENESIN/DEXTROMETHORPHAN 600MG-30MG
10 TABLET, EXTENDED RELEASE 12 HR ORAL THREE TIMES A DAY
Refills: 0 | Status: DISCONTINUED | OUTPATIENT
Start: 2020-04-02 | End: 2020-04-09

## 2020-04-02 RX ADMIN — ALBUTEROL 2 PUFF(S): 90 AEROSOL, METERED ORAL at 06:32

## 2020-04-02 RX ADMIN — ALBUTEROL 2 PUFF(S): 90 AEROSOL, METERED ORAL at 09:02

## 2020-04-02 RX ADMIN — ZINC SULFATE TAB 220 MG (50 MG ZINC EQUIVALENT) 220 MILLIGRAM(S): 220 (50 ZN) TAB at 11:07

## 2020-04-02 RX ADMIN — Medication 1500 MILLIGRAM(S): at 11:07

## 2020-04-02 RX ADMIN — Medication 3 MILLIGRAM(S): at 22:03

## 2020-04-02 RX ADMIN — ENOXAPARIN SODIUM 40 MILLIGRAM(S): 100 INJECTION SUBCUTANEOUS at 06:32

## 2020-04-02 RX ADMIN — Medication 1000 MILLIGRAM(S): at 09:08

## 2020-04-02 RX ADMIN — ENOXAPARIN SODIUM 40 MILLIGRAM(S): 100 INJECTION SUBCUTANEOUS at 09:02

## 2020-04-02 RX ADMIN — ALBUTEROL 2 PUFF(S): 90 AEROSOL, METERED ORAL at 11:08

## 2020-04-02 RX ADMIN — Medication 400 MILLIGRAM(S): at 06:32

## 2020-04-02 RX ADMIN — Medication 200 MILLIGRAM(S): at 21:11

## 2020-04-02 NOTE — PROGRESS NOTE ADULT - SUBJECTIVE AND OBJECTIVE BOX
[facility] Division of Hospital Medicine Progress Note    Patient is a 44y old  Male who presents with a chief complaint of Dyspnea and Cough (01 Apr 2020 18:17)      SUBJECTIVE / OVERNIGHT EVENTS: SHAUNNA. Slight SOB.    MEDICATIONS  (STANDING):  ALBUTerol    90 MICROgram(s) HFA Inhaler 2 Puff(s) Inhalation every 6 hours  ascorbic acid 1500 milliGRAM(s) Oral daily  enoxaparin Injectable 40 milliGRAM(s) SubCutaneous every 12 hours  hydroxychloroquine   Oral   hydroxychloroquine 200 milliGRAM(s) Oral every 12 hours  zinc sulfate 220 milliGRAM(s) Oral daily    MEDICATIONS  (PRN):  acetaminophen   Tablet .. 1000 milliGRAM(s) Oral every 8 hours PRN Temp greater or equal to 38.5C (101.3F)  benzonatate 100 milliGRAM(s) Oral three times a day PRN Cough  guaifenesin/dextromethorphan  Syrup 10 milliLiter(s) Oral every 4 hours PRN Cough      CAPILLARY BLOOD GLUCOSE      POCT Blood Glucose.: 103 mg/dL (01 Apr 2020 22:22)    I&O's Summary    01 Apr 2020 07:01  -  02 Apr 2020 07:00  --------------------------------------------------------  IN: 960 mL / OUT: 0 mL / NET: 960 mL        PHYSICAL EXAM:  Vital Signs Last 24 Hrs  T(C): 37.7 (02 Apr 2020 12:29), Max: 38.8 (01 Apr 2020 22:27)  T(F): 99.9 (02 Apr 2020 12:29), Max: 101.9 (01 Apr 2020 22:27)  HR: 91 (02 Apr 2020 12:29) (91 - 106)  BP: 107/65 (02 Apr 2020 12:29) (98/62 - 157/82)  BP(mean): --  RR: 18 (02 Apr 2020 12:29) (18 - 25)  SpO2: 92% (02 Apr 2020 12:29) (91% - 96%)  CONSTITUTIONAL: NAD, well-developed  ENMT: Moist oral mucosa, no pharyngeal injection or exudates  RESPIRATORY: Normal respiratory effort; lungs are clear to auscultation bilaterally  CARDIOVASCULAR: Regular rate and rhythm, normal S1 and S2, no murmur/rub/gallop; No lower extremity edema; Peripheral pulses present  ABDOMEN: soft, nt nd  PSYCH: A+O to person, place, and time; affect appropriate  SKIN: No rashes; no palpable lesions    LABS:                        15.1   5.04  )-----------( 145      ( 02 Apr 2020 07:51 )             45.3     04-02    133<L>  |  93<L>  |  11  ----------------------------<  84  3.6   |  22  |  0.79    Ca    8.7      02 Apr 2020 07:46    TPro  7.3  /  Alb  4.0  /  TBili  0.7  /  DBili  x   /  AST  31  /  ALT  29  /  AlkPhos  63  04-02    PT/INR - ( 01 Apr 2020 12:20 )   PT: 13.4 sec;   INR: 1.16 ratio         PTT - ( 01 Apr 2020 12:20 )  PTT:29.3 sec          COVID-19 PCR: Detected (04-01-20 @ 13:09)    Electrocardiogram/QTc Interval: 456

## 2020-04-03 LAB
ALBUMIN SERPL ELPH-MCNC: 3.7 G/DL — SIGNIFICANT CHANGE UP (ref 3.3–5)
ALP SERPL-CCNC: 63 U/L — SIGNIFICANT CHANGE UP (ref 40–120)
ALT FLD-CCNC: 27 U/L — SIGNIFICANT CHANGE UP (ref 10–45)
ANION GAP SERPL CALC-SCNC: 16 MMOL/L — SIGNIFICANT CHANGE UP (ref 5–17)
AST SERPL-CCNC: 31 U/L — SIGNIFICANT CHANGE UP (ref 10–40)
BASOPHILS # BLD AUTO: 0 K/UL — SIGNIFICANT CHANGE UP (ref 0–0.2)
BASOPHILS NFR BLD AUTO: 0 % — SIGNIFICANT CHANGE UP (ref 0–2)
BILIRUB SERPL-MCNC: 0.6 MG/DL — SIGNIFICANT CHANGE UP (ref 0.2–1.2)
BUN SERPL-MCNC: 11 MG/DL — SIGNIFICANT CHANGE UP (ref 7–23)
CALCIUM SERPL-MCNC: 8.6 MG/DL — SIGNIFICANT CHANGE UP (ref 8.4–10.5)
CHLORIDE SERPL-SCNC: 92 MMOL/L — LOW (ref 96–108)
CO2 SERPL-SCNC: 25 MMOL/L — SIGNIFICANT CHANGE UP (ref 22–31)
CREAT SERPL-MCNC: 0.93 MG/DL — SIGNIFICANT CHANGE UP (ref 0.5–1.3)
EOSINOPHIL # BLD AUTO: 0 K/UL — SIGNIFICANT CHANGE UP (ref 0–0.5)
EOSINOPHIL NFR BLD AUTO: 0 % — SIGNIFICANT CHANGE UP (ref 0–6)
FIBRINOGEN AG PPP IA-MCNC: 643 MG/DL — HIGH
GLUCOSE SERPL-MCNC: 93 MG/DL — SIGNIFICANT CHANGE UP (ref 70–99)
HCT VFR BLD CALC: 44 % — SIGNIFICANT CHANGE UP (ref 39–50)
HGB BLD-MCNC: 14.6 G/DL — SIGNIFICANT CHANGE UP (ref 13–17)
IMM GRANULOCYTES NFR BLD AUTO: 0.3 % — SIGNIFICANT CHANGE UP (ref 0–1.5)
LYMPHOCYTES # BLD AUTO: 0.65 K/UL — LOW (ref 1–3.3)
LYMPHOCYTES # BLD AUTO: 11.2 % — LOW (ref 13–44)
MCHC RBC-ENTMCNC: 29.3 PG — SIGNIFICANT CHANGE UP (ref 27–34)
MCHC RBC-ENTMCNC: 33.2 GM/DL — SIGNIFICANT CHANGE UP (ref 32–36)
MCV RBC AUTO: 88.4 FL — SIGNIFICANT CHANGE UP (ref 80–100)
MONOCYTES # BLD AUTO: 0.47 K/UL — SIGNIFICANT CHANGE UP (ref 0–0.9)
MONOCYTES NFR BLD AUTO: 8.1 % — SIGNIFICANT CHANGE UP (ref 2–14)
NEUTROPHILS # BLD AUTO: 4.64 K/UL — SIGNIFICANT CHANGE UP (ref 1.8–7.4)
NEUTROPHILS NFR BLD AUTO: 80.4 % — HIGH (ref 43–77)
NRBC # BLD: 0 /100 WBCS — SIGNIFICANT CHANGE UP (ref 0–0)
PLATELET # BLD AUTO: 160 K/UL — SIGNIFICANT CHANGE UP (ref 150–400)
POTASSIUM SERPL-MCNC: 3.9 MMOL/L — SIGNIFICANT CHANGE UP (ref 3.5–5.3)
POTASSIUM SERPL-SCNC: 3.9 MMOL/L — SIGNIFICANT CHANGE UP (ref 3.5–5.3)
PROT SERPL-MCNC: 7.7 G/DL — SIGNIFICANT CHANGE UP (ref 6–8.3)
RBC # BLD: 4.98 M/UL — SIGNIFICANT CHANGE UP (ref 4.2–5.8)
RBC # FLD: 12.8 % — SIGNIFICANT CHANGE UP (ref 10.3–14.5)
SODIUM SERPL-SCNC: 133 MMOL/L — LOW (ref 135–145)
WBC # BLD: 5.78 K/UL — SIGNIFICANT CHANGE UP (ref 3.8–10.5)
WBC # FLD AUTO: 5.78 K/UL — SIGNIFICANT CHANGE UP (ref 3.8–10.5)

## 2020-04-03 RX ADMIN — ALBUTEROL 2 PUFF(S): 90 AEROSOL, METERED ORAL at 23:59

## 2020-04-03 RX ADMIN — ALBUTEROL 2 PUFF(S): 90 AEROSOL, METERED ORAL at 00:08

## 2020-04-03 RX ADMIN — Medication 200 MILLIGRAM(S): at 06:01

## 2020-04-03 RX ADMIN — ALBUTEROL 2 PUFF(S): 90 AEROSOL, METERED ORAL at 12:15

## 2020-04-03 RX ADMIN — ENOXAPARIN SODIUM 40 MILLIGRAM(S): 100 INJECTION SUBCUTANEOUS at 06:01

## 2020-04-03 RX ADMIN — Medication 200 MILLIGRAM(S): at 18:20

## 2020-04-03 RX ADMIN — ENOXAPARIN SODIUM 40 MILLIGRAM(S): 100 INJECTION SUBCUTANEOUS at 18:20

## 2020-04-03 RX ADMIN — Medication 1000 MILLIGRAM(S): at 06:25

## 2020-04-03 RX ADMIN — ALBUTEROL 2 PUFF(S): 90 AEROSOL, METERED ORAL at 18:20

## 2020-04-03 RX ADMIN — ZINC SULFATE TAB 220 MG (50 MG ZINC EQUIVALENT) 220 MILLIGRAM(S): 220 (50 ZN) TAB at 14:15

## 2020-04-03 RX ADMIN — ALBUTEROL 2 PUFF(S): 90 AEROSOL, METERED ORAL at 06:03

## 2020-04-03 RX ADMIN — Medication 1500 MILLIGRAM(S): at 14:15

## 2020-04-03 NOTE — PROGRESS NOTE ADULT - SUBJECTIVE AND OBJECTIVE BOX
[facility] Division of Hospital Medicine Progress Note    Patient is a 44y old  Male who presents with a chief complaint of Dyspnea and Cough (02 Apr 2020 16:09)      SUBJECTIVE / OVERNIGHT EVENTS: Still some SOB and cough. SHAUNNA.    MEDICATIONS  (STANDING):  ALBUTerol    90 MICROgram(s) HFA Inhaler 2 Puff(s) Inhalation every 6 hours  ascorbic acid 1500 milliGRAM(s) Oral daily  enoxaparin Injectable 40 milliGRAM(s) SubCutaneous every 12 hours  hydroxychloroquine   Oral   hydroxychloroquine 200 milliGRAM(s) Oral every 12 hours  zinc sulfate 220 milliGRAM(s) Oral daily    MEDICATIONS  (PRN):  acetaminophen   Tablet .. 1000 milliGRAM(s) Oral every 8 hours PRN Temp greater or equal to 38.5C (101.3F)  guaifenesin/dextromethorphan  Syrup 10 milliLiter(s) Oral three times a day PRN Cough      CAPILLARY BLOOD GLUCOSE        I&O's Summary    02 Apr 2020 07:01  -  03 Apr 2020 07:00  --------------------------------------------------------  IN: 360 mL / OUT: 0 mL / NET: 360 mL    03 Apr 2020 07:01  -  03 Apr 2020 13:35  --------------------------------------------------------  IN: 360 mL / OUT: 0 mL / NET: 360 mL        PHYSICAL EXAM:  Vital Signs Last 24 Hrs  T(C): 36.9 (03 Apr 2020 12:23), Max: 38.9 (02 Apr 2020 17:27)  T(F): 98.4 (03 Apr 2020 12:23), Max: 102 (02 Apr 2020 17:27)  HR: 93 (03 Apr 2020 12:23) (93 - 104)  BP: 121/74 (03 Apr 2020 12:23) (108/73 - 149/85)  BP(mean): --  RR: 20 (03 Apr 2020 12:23) (20 - 22)  SpO2: 93% (03 Apr 2020 12:23) (92% - 97%)  CONSTITUTIONAL: NAD, well-developed  ENMT: Moist oral mucosa, no pharyngeal injection or exudates  RESPIRATORY: Normal respiratory effort; lungs are clear to auscultation bilaterally  CARDIOVASCULAR: Regular rate and rhythm, normal S1 and S2, no murmur/rub/gallop; No lower extremity edema; Peripheral pulses present  ABDOMEN: soft, nt nd  PSYCH: A+O to person, place, and time; affect appropriate  SKIN: No rashes; no palpable lesions    LABS:                        14.6   5.78  )-----------( 160      ( 03 Apr 2020 07:05 )             44.0     04-03    133<L>  |  92<L>  |  11  ----------------------------<  93  3.9   |  25  |  0.93    Ca    8.6      03 Apr 2020 07:04    TPro  7.7  /  Alb  3.7  /  TBili  0.6  /  DBili  x   /  AST  31  /  ALT  27  /  AlkPhos  63  04-03              COVID-19 PCR: Detected (04-01-20 @ 13:09)

## 2020-04-04 LAB
ALBUMIN SERPL ELPH-MCNC: 3.8 G/DL — SIGNIFICANT CHANGE UP (ref 3.3–5)
ALP SERPL-CCNC: 62 U/L — SIGNIFICANT CHANGE UP (ref 40–120)
ALT FLD-CCNC: 24 U/L — SIGNIFICANT CHANGE UP (ref 10–45)
ANION GAP SERPL CALC-SCNC: 17 MMOL/L — SIGNIFICANT CHANGE UP (ref 5–17)
AST SERPL-CCNC: 27 U/L — SIGNIFICANT CHANGE UP (ref 10–40)
BASOPHILS # BLD AUTO: 0.01 K/UL — SIGNIFICANT CHANGE UP (ref 0–0.2)
BASOPHILS NFR BLD AUTO: 0.2 % — SIGNIFICANT CHANGE UP (ref 0–2)
BILIRUB SERPL-MCNC: 0.6 MG/DL — SIGNIFICANT CHANGE UP (ref 0.2–1.2)
BUN SERPL-MCNC: 13 MG/DL — SIGNIFICANT CHANGE UP (ref 7–23)
CALCIUM SERPL-MCNC: 8.9 MG/DL — SIGNIFICANT CHANGE UP (ref 8.4–10.5)
CHLORIDE SERPL-SCNC: 93 MMOL/L — LOW (ref 96–108)
CO2 SERPL-SCNC: 24 MMOL/L — SIGNIFICANT CHANGE UP (ref 22–31)
CREAT SERPL-MCNC: 0.87 MG/DL — SIGNIFICANT CHANGE UP (ref 0.5–1.3)
CRP SERPL-MCNC: 12.5 MG/DL — HIGH (ref 0–0.4)
D DIMER BLD IA.RAPID-MCNC: 314 NG/ML DDU — HIGH
EOSINOPHIL # BLD AUTO: 0.03 K/UL — SIGNIFICANT CHANGE UP (ref 0–0.5)
EOSINOPHIL NFR BLD AUTO: 0.5 % — SIGNIFICANT CHANGE UP (ref 0–6)
FERRITIN SERPL-MCNC: 1711 NG/ML — HIGH (ref 30–400)
GLUCOSE SERPL-MCNC: 74 MG/DL — SIGNIFICANT CHANGE UP (ref 70–99)
HCT VFR BLD CALC: 41.3 % — SIGNIFICANT CHANGE UP (ref 39–50)
HGB BLD-MCNC: 14.2 G/DL — SIGNIFICANT CHANGE UP (ref 13–17)
IMM GRANULOCYTES NFR BLD AUTO: 0.5 % — SIGNIFICANT CHANGE UP (ref 0–1.5)
LDH SERPL L TO P-CCNC: 358 U/L — HIGH (ref 50–242)
LYMPHOCYTES # BLD AUTO: 1.08 K/UL — SIGNIFICANT CHANGE UP (ref 1–3.3)
LYMPHOCYTES # BLD AUTO: 16.8 % — SIGNIFICANT CHANGE UP (ref 13–44)
MCHC RBC-ENTMCNC: 30.5 PG — SIGNIFICANT CHANGE UP (ref 27–34)
MCHC RBC-ENTMCNC: 34.4 GM/DL — SIGNIFICANT CHANGE UP (ref 32–36)
MCV RBC AUTO: 88.8 FL — SIGNIFICANT CHANGE UP (ref 80–100)
MONOCYTES # BLD AUTO: 0.62 K/UL — SIGNIFICANT CHANGE UP (ref 0–0.9)
MONOCYTES NFR BLD AUTO: 9.6 % — SIGNIFICANT CHANGE UP (ref 2–14)
NEUTROPHILS # BLD AUTO: 4.67 K/UL — SIGNIFICANT CHANGE UP (ref 1.8–7.4)
NEUTROPHILS NFR BLD AUTO: 72.4 % — SIGNIFICANT CHANGE UP (ref 43–77)
NRBC # BLD: 0 /100 WBCS — SIGNIFICANT CHANGE UP (ref 0–0)
PLATELET # BLD AUTO: 175 K/UL — SIGNIFICANT CHANGE UP (ref 150–400)
POTASSIUM SERPL-MCNC: 3.8 MMOL/L — SIGNIFICANT CHANGE UP (ref 3.5–5.3)
POTASSIUM SERPL-SCNC: 3.8 MMOL/L — SIGNIFICANT CHANGE UP (ref 3.5–5.3)
PROCALCITONIN SERPL-MCNC: 0.24 NG/ML — HIGH (ref 0.02–0.1)
PROT SERPL-MCNC: 7.2 G/DL — SIGNIFICANT CHANGE UP (ref 6–8.3)
RBC # BLD: 4.65 M/UL — SIGNIFICANT CHANGE UP (ref 4.2–5.8)
RBC # FLD: 13.1 % — SIGNIFICANT CHANGE UP (ref 10.3–14.5)
SODIUM SERPL-SCNC: 134 MMOL/L — LOW (ref 135–145)
WBC # BLD: 6.44 K/UL — SIGNIFICANT CHANGE UP (ref 3.8–10.5)
WBC # FLD AUTO: 6.44 K/UL — SIGNIFICANT CHANGE UP (ref 3.8–10.5)

## 2020-04-04 PROCEDURE — ZZZZZ: CPT

## 2020-04-04 RX ORDER — LANOLIN ALCOHOL/MO/W.PET/CERES
3 CREAM (GRAM) TOPICAL ONCE
Refills: 0 | Status: COMPLETED | OUTPATIENT
Start: 2020-04-04 | End: 2020-04-04

## 2020-04-04 RX ADMIN — ENOXAPARIN SODIUM 40 MILLIGRAM(S): 100 INJECTION SUBCUTANEOUS at 18:37

## 2020-04-04 RX ADMIN — ALBUTEROL 2 PUFF(S): 90 AEROSOL, METERED ORAL at 12:08

## 2020-04-04 RX ADMIN — ENOXAPARIN SODIUM 40 MILLIGRAM(S): 100 INJECTION SUBCUTANEOUS at 06:15

## 2020-04-04 RX ADMIN — Medication 3 MILLIGRAM(S): at 23:24

## 2020-04-04 RX ADMIN — ALBUTEROL 2 PUFF(S): 90 AEROSOL, METERED ORAL at 18:37

## 2020-04-04 RX ADMIN — ENOXAPARIN SODIUM 40 MILLIGRAM(S): 100 INJECTION SUBCUTANEOUS at 06:17

## 2020-04-04 RX ADMIN — Medication 10 MILLILITER(S): at 18:38

## 2020-04-04 RX ADMIN — ALBUTEROL 2 PUFF(S): 90 AEROSOL, METERED ORAL at 06:17

## 2020-04-04 RX ADMIN — Medication 1500 MILLIGRAM(S): at 12:09

## 2020-04-04 RX ADMIN — ALBUTEROL 2 PUFF(S): 90 AEROSOL, METERED ORAL at 23:08

## 2020-04-04 RX ADMIN — Medication 200 MILLIGRAM(S): at 18:37

## 2020-04-04 RX ADMIN — Medication 200 MILLIGRAM(S): at 06:16

## 2020-04-04 NOTE — PROGRESS NOTE ADULT - SUBJECTIVE AND OBJECTIVE BOX
[facility] Division of Hospital Medicine Progress Note    Patient is a 44y old  Male who presents with a chief complaint of Dyspnea and Cough (03 Apr 2020 13:35)      SUBJECTIVE / OVERNIGHT EVENTS: Febrile overnight. Breathing has improved.    MEDICATIONS  (STANDING):  ALBUTerol    90 MICROgram(s) HFA Inhaler 2 Puff(s) Inhalation every 6 hours  ascorbic acid 1500 milliGRAM(s) Oral daily  enoxaparin Injectable 40 milliGRAM(s) SubCutaneous every 12 hours  hydroxychloroquine   Oral   hydroxychloroquine 200 milliGRAM(s) Oral every 12 hours  zinc sulfate 220 milliGRAM(s) Oral daily    MEDICATIONS  (PRN):  acetaminophen   Tablet .. 1000 milliGRAM(s) Oral every 8 hours PRN Temp greater or equal to 38.5C (101.3F)  guaifenesin/dextromethorphan  Syrup 10 milliLiter(s) Oral three times a day PRN Cough      CAPILLARY BLOOD GLUCOSE        I&O's Summary    03 Apr 2020 07:01  -  04 Apr 2020 07:00  --------------------------------------------------------  IN: 480 mL / OUT: 0 mL / NET: 480 mL        PHYSICAL EXAM:  Vital Signs Last 24 Hrs  T(C): 36.7 (04 Apr 2020 09:32), Max: 38.3 (03 Apr 2020 20:50)  T(F): 98.1 (04 Apr 2020 09:32), Max: 101 (03 Apr 2020 20:50)  HR: 88 (04 Apr 2020 09:32) (88 - 99)  BP: 119/82 (04 Apr 2020 09:32) (113/74 - 129/82)  BP(mean): --  RR: 20 (04 Apr 2020 09:32) (20 - 20)  SpO2: 92% (04 Apr 2020 09:32) (92% - 97%)  CONSTITUTIONAL: NAD, well-developed  ENMT: Moist oral mucosa, no pharyngeal injection or exudates  RESPIRATORY: Normal respiratory effort; lungs are clear to auscultation bilaterally  CARDIOVASCULAR: Regular rate and rhythm, normal S1 and S2, no murmur/rub/gallop; No lower extremity edema; Peripheral pulses present  ABDOMEN: soft, nt nd  PSYCH: A+O to person, place, and time; affect appropriate  SKIN: No rashes; no palpable lesions    LABS:                        14.2   6.44  )-----------( 175      ( 04 Apr 2020 06:42 )             41.3     04-04    134<L>  |  93<L>  |  13  ----------------------------<  74  3.8   |  24  |  0.87    Ca    8.9      04 Apr 2020 06:42    TPro  7.2  /  Alb  3.8  /  TBili  0.6  /  DBili  x   /  AST  27  /  ALT  24  /  AlkPhos  62  04-04              COVID-19 PCR: Detected (04-01-20 @ 13:09)

## 2020-04-04 NOTE — PROVIDER CONTACT NOTE (OTHER) - ASSESSMENT
Afsaneh x4, VSS. Pt requested melatonin 3mg for sleeping, states "hasn't had a good night sleep in 8 days".

## 2020-04-05 LAB
ALBUMIN SERPL ELPH-MCNC: 3.7 G/DL — SIGNIFICANT CHANGE UP (ref 3.3–5)
ALP SERPL-CCNC: 65 U/L — SIGNIFICANT CHANGE UP (ref 40–120)
ALT FLD-CCNC: 26 U/L — SIGNIFICANT CHANGE UP (ref 10–45)
ANION GAP SERPL CALC-SCNC: 15 MMOL/L — SIGNIFICANT CHANGE UP (ref 5–17)
AST SERPL-CCNC: 28 U/L — SIGNIFICANT CHANGE UP (ref 10–40)
BILIRUB SERPL-MCNC: 0.7 MG/DL — SIGNIFICANT CHANGE UP (ref 0.2–1.2)
BUN SERPL-MCNC: 14 MG/DL — SIGNIFICANT CHANGE UP (ref 7–23)
CALCIUM SERPL-MCNC: 8.9 MG/DL — SIGNIFICANT CHANGE UP (ref 8.4–10.5)
CHLORIDE SERPL-SCNC: 95 MMOL/L — LOW (ref 96–108)
CO2 SERPL-SCNC: 25 MMOL/L — SIGNIFICANT CHANGE UP (ref 22–31)
CREAT SERPL-MCNC: 0.84 MG/DL — SIGNIFICANT CHANGE UP (ref 0.5–1.3)
GLUCOSE SERPL-MCNC: 78 MG/DL — SIGNIFICANT CHANGE UP (ref 70–99)
HCT VFR BLD CALC: 44.5 % — SIGNIFICANT CHANGE UP (ref 39–50)
HGB BLD-MCNC: 14.7 G/DL — SIGNIFICANT CHANGE UP (ref 13–17)
MCHC RBC-ENTMCNC: 29.3 PG — SIGNIFICANT CHANGE UP (ref 27–34)
MCHC RBC-ENTMCNC: 33 GM/DL — SIGNIFICANT CHANGE UP (ref 32–36)
MCV RBC AUTO: 88.8 FL — SIGNIFICANT CHANGE UP (ref 80–100)
NRBC # BLD: 0 /100 WBCS — SIGNIFICANT CHANGE UP (ref 0–0)
PLATELET # BLD AUTO: 208 K/UL — SIGNIFICANT CHANGE UP (ref 150–400)
POTASSIUM SERPL-MCNC: 3.9 MMOL/L — SIGNIFICANT CHANGE UP (ref 3.5–5.3)
POTASSIUM SERPL-SCNC: 3.9 MMOL/L — SIGNIFICANT CHANGE UP (ref 3.5–5.3)
PROT SERPL-MCNC: 7.3 G/DL — SIGNIFICANT CHANGE UP (ref 6–8.3)
RBC # BLD: 5.01 M/UL — SIGNIFICANT CHANGE UP (ref 4.2–5.8)
RBC # FLD: 12.9 % — SIGNIFICANT CHANGE UP (ref 10.3–14.5)
SODIUM SERPL-SCNC: 135 MMOL/L — SIGNIFICANT CHANGE UP (ref 135–145)
WBC # BLD: 6.62 K/UL — SIGNIFICANT CHANGE UP (ref 3.8–10.5)
WBC # FLD AUTO: 6.62 K/UL — SIGNIFICANT CHANGE UP (ref 3.8–10.5)

## 2020-04-05 PROCEDURE — ZZZZZ: CPT

## 2020-04-05 RX ORDER — SODIUM CHLORIDE 0.65 %
1 AEROSOL, SPRAY (ML) NASAL ONCE
Refills: 0 | Status: COMPLETED | OUTPATIENT
Start: 2020-04-05 | End: 2020-04-05

## 2020-04-05 RX ORDER — FUROSEMIDE 40 MG
20 TABLET ORAL ONCE
Refills: 0 | Status: COMPLETED | OUTPATIENT
Start: 2020-04-05 | End: 2020-04-05

## 2020-04-05 RX ADMIN — Medication 200 MILLIGRAM(S): at 05:22

## 2020-04-05 RX ADMIN — ENOXAPARIN SODIUM 40 MILLIGRAM(S): 100 INJECTION SUBCUTANEOUS at 18:19

## 2020-04-05 RX ADMIN — ALBUTEROL 2 PUFF(S): 90 AEROSOL, METERED ORAL at 18:19

## 2020-04-05 RX ADMIN — ALBUTEROL 2 PUFF(S): 90 AEROSOL, METERED ORAL at 13:29

## 2020-04-05 RX ADMIN — ALBUTEROL 2 PUFF(S): 90 AEROSOL, METERED ORAL at 05:21

## 2020-04-05 RX ADMIN — Medication 1 SPRAY(S): at 06:31

## 2020-04-05 RX ADMIN — Medication 1500 MILLIGRAM(S): at 12:51

## 2020-04-05 RX ADMIN — Medication 20 MILLIGRAM(S): at 13:24

## 2020-04-05 RX ADMIN — ENOXAPARIN SODIUM 40 MILLIGRAM(S): 100 INJECTION SUBCUTANEOUS at 05:22

## 2020-04-05 RX ADMIN — Medication 200 MILLIGRAM(S): at 18:19

## 2020-04-05 NOTE — PROVIDER CONTACT NOTE (OTHER) - ASSESSMENT
PrabhjotO x4, VSS. OOB independent. 5L NC tolerating - pt requesting order for saline spray. States, "my nose feels clogged".

## 2020-04-05 NOTE — PROGRESS NOTE ADULT - SUBJECTIVE AND OBJECTIVE BOX
[facility] Division of Hospital Medicine Progress Note    Patient is a 44y old  Male who presents with a chief complaint of Dyspnea and Cough (04 Apr 2020 12:15)      SUBJECTIVE / OVERNIGHT EVENTS: SHAUNNA. Continues to be on 5L NC. Says that nose feels dry.    MEDICATIONS  (STANDING):  ALBUTerol    90 MICROgram(s) HFA Inhaler 2 Puff(s) Inhalation every 6 hours  ascorbic acid 1500 milliGRAM(s) Oral daily  enoxaparin Injectable 40 milliGRAM(s) SubCutaneous every 12 hours  hydroxychloroquine   Oral   hydroxychloroquine 200 milliGRAM(s) Oral every 12 hours  zinc sulfate 220 milliGRAM(s) Oral daily    MEDICATIONS  (PRN):  acetaminophen   Tablet .. 1000 milliGRAM(s) Oral every 8 hours PRN Temp greater or equal to 38.5C (101.3F)  guaifenesin/dextromethorphan  Syrup 10 milliLiter(s) Oral three times a day PRN Cough      CAPILLARY BLOOD GLUCOSE        I&O's Summary    04 Apr 2020 07:01  -  05 Apr 2020 07:00  --------------------------------------------------------  IN: 720 mL / OUT: 0 mL / NET: 720 mL        PHYSICAL EXAM:  Vital Signs Last 24 Hrs  T(C): 37 (05 Apr 2020 10:22), Max: 38.2 (04 Apr 2020 20:59)  T(F): 98.6 (05 Apr 2020 10:22), Max: 100.8 (04 Apr 2020 20:59)  HR: 94 (05 Apr 2020 10:22) (92 - 96)  BP: 110/75 (05 Apr 2020 10:22) (110/75 - 120/75)  BP(mean): --  RR: 20 (05 Apr 2020 10:22) (20 - 20)  SpO2: 92% (05 Apr 2020 10:22) (91% - 94%)  CONSTITUTIONAL: NAD, well-developed  ENMT: Moist oral mucosa, no pharyngeal injection or exudates  RESPIRATORY: Normal respiratory effort; lungs are clear to auscultation bilaterally  CARDIOVASCULAR: Regular rate and rhythm, normal S1 and S2, no murmur/rub/gallop; No lower extremity edema; Peripheral pulses present  ABDOMEN: soft, nt nd  PSYCH: A+O to person, place, and time; affect appropriate  SKIN: No rashes; no palpable lesions    LABS:                        14.7   6.62  )-----------( 208      ( 05 Apr 2020 06:35 )             44.5     04-05    135  |  95<L>  |  14  ----------------------------<  78  3.9   |  25  |  0.84    Ca    8.9      05 Apr 2020 06:35    TPro  7.3  /  Alb  3.7  /  TBili  0.7  /  DBili  x   /  AST  28  /  ALT  26  /  AlkPhos  65  04-05              COVID-19 PCR: Detected (04-01-20 @ 13:09)      RADIOLOGY & ADDITIONAL TESTS:  Imaging from Last 24 Hours:  Electrocardiogram/QTc Interval:    COORDINATION OF CARE:  Care Discussed with Consultants/Other Providers: [facility] Division of Hospital Medicine Progress Note    Patient is a 44y old  Male who presents with a chief complaint of Dyspnea and Cough (04 Apr 2020 12:15)      SUBJECTIVE / OVERNIGHT EVENTS: SHAUNNA. Continues to be on 5L NC. Says that nose feels dry.    MEDICATIONS  (STANDING):  ALBUTerol    90 MICROgram(s) HFA Inhaler 2 Puff(s) Inhalation every 6 hours  ascorbic acid 1500 milliGRAM(s) Oral daily  enoxaparin Injectable 40 milliGRAM(s) SubCutaneous every 12 hours  hydroxychloroquine   Oral   hydroxychloroquine 200 milliGRAM(s) Oral every 12 hours  zinc sulfate 220 milliGRAM(s) Oral daily    MEDICATIONS  (PRN):  acetaminophen   Tablet .. 1000 milliGRAM(s) Oral every 8 hours PRN Temp greater or equal to 38.5C (101.3F)  guaifenesin/dextromethorphan  Syrup 10 milliLiter(s) Oral three times a day PRN Cough      CAPILLARY BLOOD GLUCOSE        I&O's Summary    04 Apr 2020 07:01  -  05 Apr 2020 07:00  --------------------------------------------------------  IN: 720 mL / OUT: 0 mL / NET: 720 mL        PHYSICAL EXAM:  Vital Signs Last 24 Hrs  T(C): 37 (05 Apr 2020 10:22), Max: 38.2 (04 Apr 2020 20:59)  T(F): 98.6 (05 Apr 2020 10:22), Max: 100.8 (04 Apr 2020 20:59)  HR: 94 (05 Apr 2020 10:22) (92 - 96)  BP: 110/75 (05 Apr 2020 10:22) (110/75 - 120/75)  BP(mean): --  RR: 20 (05 Apr 2020 10:22) (20 - 20)  SpO2: 92% (05 Apr 2020 10:22) (91% - 94%)  CONSTITUTIONAL: NAD, well-developed  ENMT: Moist oral mucosa, no pharyngeal injection or exudates  RESPIRATORY: Normal respiratory effort; lungs are clear to auscultation bilaterally  CARDIOVASCULAR: Regular rate and rhythm, normal S1 and S2, no murmur/rub/gallop; No lower extremity edema; Peripheral pulses present  ABDOMEN: soft, nt nd  PSYCH: A+O to person, place, and time; affect appropriate  SKIN: No rashes; no palpable lesions    LABS:                        14.7   6.62  )-----------( 208      ( 05 Apr 2020 06:35 )             44.5     04-05    135  |  95<L>  |  14  ----------------------------<  78  3.9   |  25  |  0.84    Ca    8.9      05 Apr 2020 06:35    TPro  7.3  /  Alb  3.7  /  TBili  0.7  /  DBili  x   /  AST  28  /  ALT  26  /  AlkPhos  65  04-05              COVID-19 PCR: Detected (04-01-20 @ 13:09)

## 2020-04-06 LAB
ALBUMIN SERPL ELPH-MCNC: 3.6 G/DL — SIGNIFICANT CHANGE UP (ref 3.3–5)
ALP SERPL-CCNC: 64 U/L — SIGNIFICANT CHANGE UP (ref 40–120)
ALT FLD-CCNC: 31 U/L — SIGNIFICANT CHANGE UP (ref 10–45)
ANION GAP SERPL CALC-SCNC: 15 MMOL/L — SIGNIFICANT CHANGE UP (ref 5–17)
AST SERPL-CCNC: 32 U/L — SIGNIFICANT CHANGE UP (ref 10–40)
BASOPHILS # BLD AUTO: 0 K/UL — SIGNIFICANT CHANGE UP (ref 0–0.2)
BASOPHILS NFR BLD AUTO: 0 % — SIGNIFICANT CHANGE UP (ref 0–2)
BILIRUB SERPL-MCNC: 0.6 MG/DL — SIGNIFICANT CHANGE UP (ref 0.2–1.2)
BUN SERPL-MCNC: 16 MG/DL — SIGNIFICANT CHANGE UP (ref 7–23)
CALCIUM SERPL-MCNC: 9 MG/DL — SIGNIFICANT CHANGE UP (ref 8.4–10.5)
CHLORIDE SERPL-SCNC: 94 MMOL/L — LOW (ref 96–108)
CO2 SERPL-SCNC: 26 MMOL/L — SIGNIFICANT CHANGE UP (ref 22–31)
CREAT SERPL-MCNC: 0.79 MG/DL — SIGNIFICANT CHANGE UP (ref 0.5–1.3)
EOSINOPHIL # BLD AUTO: 0.22 K/UL — SIGNIFICANT CHANGE UP (ref 0–0.5)
EOSINOPHIL NFR BLD AUTO: 4.3 % — SIGNIFICANT CHANGE UP (ref 0–6)
GLUCOSE SERPL-MCNC: 91 MG/DL — SIGNIFICANT CHANGE UP (ref 70–99)
HCT VFR BLD CALC: 43.6 % — SIGNIFICANT CHANGE UP (ref 39–50)
HGB BLD-MCNC: 14.7 G/DL — SIGNIFICANT CHANGE UP (ref 13–17)
LDH SERPL L TO P-CCNC: 301 U/L — HIGH (ref 50–242)
LYMPHOCYTES # BLD AUTO: 0.71 K/UL — LOW (ref 1–3.3)
LYMPHOCYTES # BLD AUTO: 13.7 % — SIGNIFICANT CHANGE UP (ref 13–44)
MCHC RBC-ENTMCNC: 29.9 PG — SIGNIFICANT CHANGE UP (ref 27–34)
MCHC RBC-ENTMCNC: 33.7 GM/DL — SIGNIFICANT CHANGE UP (ref 32–36)
MCV RBC AUTO: 88.6 FL — SIGNIFICANT CHANGE UP (ref 80–100)
MONOCYTES # BLD AUTO: 0.26 K/UL — SIGNIFICANT CHANGE UP (ref 0–0.9)
MONOCYTES NFR BLD AUTO: 5.1 % — SIGNIFICANT CHANGE UP (ref 2–14)
NEUTROPHILS # BLD AUTO: 3.83 K/UL — SIGNIFICANT CHANGE UP (ref 1.8–7.4)
NEUTROPHILS NFR BLD AUTO: 72.6 % — SIGNIFICANT CHANGE UP (ref 43–77)
PLATELET # BLD AUTO: 250 K/UL — SIGNIFICANT CHANGE UP (ref 150–400)
POTASSIUM SERPL-MCNC: 3.5 MMOL/L — SIGNIFICANT CHANGE UP (ref 3.5–5.3)
POTASSIUM SERPL-SCNC: 3.5 MMOL/L — SIGNIFICANT CHANGE UP (ref 3.5–5.3)
PROT SERPL-MCNC: 7.2 G/DL — SIGNIFICANT CHANGE UP (ref 6–8.3)
RBC # BLD: 4.92 M/UL — SIGNIFICANT CHANGE UP (ref 4.2–5.8)
RBC # FLD: 12.7 % — SIGNIFICANT CHANGE UP (ref 10.3–14.5)
SODIUM SERPL-SCNC: 135 MMOL/L — SIGNIFICANT CHANGE UP (ref 135–145)
WBC # BLD: 5.15 K/UL — SIGNIFICANT CHANGE UP (ref 3.8–10.5)
WBC # FLD AUTO: 5.15 K/UL — SIGNIFICANT CHANGE UP (ref 3.8–10.5)

## 2020-04-06 PROCEDURE — 99232 SBSQ HOSP IP/OBS MODERATE 35: CPT

## 2020-04-06 RX ADMIN — ENOXAPARIN SODIUM 40 MILLIGRAM(S): 100 INJECTION SUBCUTANEOUS at 05:47

## 2020-04-06 RX ADMIN — ENOXAPARIN SODIUM 40 MILLIGRAM(S): 100 INJECTION SUBCUTANEOUS at 17:44

## 2020-04-06 RX ADMIN — Medication 1500 MILLIGRAM(S): at 12:24

## 2020-04-06 RX ADMIN — ALBUTEROL 2 PUFF(S): 90 AEROSOL, METERED ORAL at 05:48

## 2020-04-06 RX ADMIN — Medication 1000 MILLIGRAM(S): at 21:38

## 2020-04-06 RX ADMIN — ALBUTEROL 2 PUFF(S): 90 AEROSOL, METERED ORAL at 23:51

## 2020-04-06 RX ADMIN — Medication 200 MILLIGRAM(S): at 05:47

## 2020-04-06 RX ADMIN — Medication 10 MILLILITER(S): at 21:25

## 2020-04-06 RX ADMIN — ALBUTEROL 2 PUFF(S): 90 AEROSOL, METERED ORAL at 00:51

## 2020-04-06 NOTE — PROGRESS NOTE ADULT - SUBJECTIVE AND OBJECTIVE BOX
[facility] Division of Hospital Medicine Progress Note    Patient is a 44y old  Male with underlying asthma and obesity who p/w Dyspnea and Cough, developed fever, worsening hypoxemia and was found to have bilateral lung opacities on XR  + COVID19 infection (05 Apr 2020 12:21)      SUBJECTIVE / OVERNIGHT EVENTS:  Continues reporting SOB, however, stable on NC. 4-5L NC O2Sat 95%  Plaquenil completed  Had diarrhea      ADDITIONAL REVIEW OF SYSTEMS:  Negative except above    MEDICATIONS  (STANDING):  ALBUTerol    90 MICROgram(s) HFA Inhaler 2 Puff(s) Inhalation every 6 hours  enoxaparin Injectable 40 milliGRAM(s) SubCutaneous every 12 hours    MEDICATIONS  (PRN):  acetaminophen   Tablet .. 1000 milliGRAM(s) Oral every 8 hours PRN Temp greater or equal to 38.5C (101.3F)  guaifenesin/dextromethorphan  Syrup 10 milliLiter(s) Oral three times a day PRN Cough      CAPILLARY BLOOD GLUCOSE        I&O's Summary    05 Apr 2020 07:01  -  06 Apr 2020 07:00  --------------------------------------------------------  IN: 240 mL / OUT: 0 mL / NET: 240 mL    06 Apr 2020 07:01  -  06 Apr 2020 15:18  --------------------------------------------------------  IN: 180 mL / OUT: 0 mL / NET: 180 mL        PHYSICAL EXAM:  Vital Signs Last 24 Hrs  T(C): 36.8 (06 Apr 2020 12:24), Max: 37.2 (05 Apr 2020 20:46)  T(F): 98.2 (06 Apr 2020 12:24), Max: 99 (05 Apr 2020 20:46)  HR: 92 (06 Apr 2020 12:24) (90 - 94)  BP: 132/84 (06 Apr 2020 12:24) (111/76 - 132/84)  BP(mean): --  RR: 20 (06 Apr 2020 12:24) (20 - 21)  SpO2: 92% (06 Apr 2020 12:24) (92% - 95%)      CONSTITUTIONAL: NAD, well-developed, well-groomed  ENMT: Moist oral mucosa  RESPIRATORY: Normal respiratory effort; lung auscultation deferred  CARDIOVASCULAR: deferred  ABDOMEN: Nontender to palpation, normoactive bowel sounds, no rebound/guarding; No hepatosplenomegaly  PSYCH: A+O to person, place, and time; affect appropriate  NEUROLOGY: Alert and oriented, answering my questions  SKIN: deferred    LABS:                        14.7   5.15  )-----------( 250      ( 06 Apr 2020 07:06 )             43.6     04-06    135  |  94<L>  |  16  ----------------------------<  91  3.5   |  26  |  0.79    Ca    9.0      06 Apr 2020 07:05    TPro  7.2  /  Alb  3.6  /  TBili  0.6  /  DBili  x   /  AST  32  /  ALT  31  /  AlkPhos  64  04-06              COVID-19 PCR: Detected (04-01-20 @ 13:09)      RADIOLOGY & ADDITIONAL TESTS:  Imaging from Last 24 Hours:  Electrocardiogram/QTc Interval:    COORDINATION OF CARE:  Care Discussed with Consultants/Other Providers:

## 2020-04-07 LAB
ALBUMIN SERPL ELPH-MCNC: 3.9 G/DL — SIGNIFICANT CHANGE UP (ref 3.3–5)
ALP SERPL-CCNC: 63 U/L — SIGNIFICANT CHANGE UP (ref 40–120)
ALT FLD-CCNC: 44 U/L — SIGNIFICANT CHANGE UP (ref 10–45)
ANION GAP SERPL CALC-SCNC: 15 MMOL/L — SIGNIFICANT CHANGE UP (ref 5–17)
AST SERPL-CCNC: 42 U/L — HIGH (ref 10–40)
BASOPHILS # BLD AUTO: 0.03 K/UL — SIGNIFICANT CHANGE UP (ref 0–0.2)
BASOPHILS NFR BLD AUTO: 0.6 % — SIGNIFICANT CHANGE UP (ref 0–2)
BILIRUB SERPL-MCNC: 0.6 MG/DL — SIGNIFICANT CHANGE UP (ref 0.2–1.2)
BUN SERPL-MCNC: 15 MG/DL — SIGNIFICANT CHANGE UP (ref 7–23)
CALCIUM SERPL-MCNC: 9.5 MG/DL — SIGNIFICANT CHANGE UP (ref 8.4–10.5)
CHLORIDE SERPL-SCNC: 95 MMOL/L — LOW (ref 96–108)
CO2 SERPL-SCNC: 27 MMOL/L — SIGNIFICANT CHANGE UP (ref 22–31)
CREAT SERPL-MCNC: 0.82 MG/DL — SIGNIFICANT CHANGE UP (ref 0.5–1.3)
CRP SERPL-MCNC: 4.33 MG/DL — HIGH (ref 0–0.4)
D DIMER BLD IA.RAPID-MCNC: 371 NG/ML DDU — HIGH
EOSINOPHIL # BLD AUTO: 0.17 K/UL — SIGNIFICANT CHANGE UP (ref 0–0.5)
EOSINOPHIL NFR BLD AUTO: 3.2 % — SIGNIFICANT CHANGE UP (ref 0–6)
FERRITIN SERPL-MCNC: 1658 NG/ML — HIGH (ref 30–400)
GLUCOSE SERPL-MCNC: 78 MG/DL — SIGNIFICANT CHANGE UP (ref 70–99)
HCT VFR BLD CALC: 46.4 % — SIGNIFICANT CHANGE UP (ref 39–50)
HGB BLD-MCNC: 15 G/DL — SIGNIFICANT CHANGE UP (ref 13–17)
IMM GRANULOCYTES NFR BLD AUTO: 0.6 % — SIGNIFICANT CHANGE UP (ref 0–1.5)
LYMPHOCYTES # BLD AUTO: 1.13 K/UL — SIGNIFICANT CHANGE UP (ref 1–3.3)
LYMPHOCYTES # BLD AUTO: 21.3 % — SIGNIFICANT CHANGE UP (ref 13–44)
MCHC RBC-ENTMCNC: 29.1 PG — SIGNIFICANT CHANGE UP (ref 27–34)
MCHC RBC-ENTMCNC: 32.3 GM/DL — SIGNIFICANT CHANGE UP (ref 32–36)
MCV RBC AUTO: 89.9 FL — SIGNIFICANT CHANGE UP (ref 80–100)
MONOCYTES # BLD AUTO: 0.58 K/UL — SIGNIFICANT CHANGE UP (ref 0–0.9)
MONOCYTES NFR BLD AUTO: 10.9 % — SIGNIFICANT CHANGE UP (ref 2–14)
NEUTROPHILS # BLD AUTO: 3.36 K/UL — SIGNIFICANT CHANGE UP (ref 1.8–7.4)
NEUTROPHILS NFR BLD AUTO: 63.4 % — SIGNIFICANT CHANGE UP (ref 43–77)
NRBC # BLD: 0 /100 WBCS — SIGNIFICANT CHANGE UP (ref 0–0)
PLATELET # BLD AUTO: 276 K/UL — SIGNIFICANT CHANGE UP (ref 150–400)
POTASSIUM SERPL-MCNC: 3.8 MMOL/L — SIGNIFICANT CHANGE UP (ref 3.5–5.3)
POTASSIUM SERPL-SCNC: 3.8 MMOL/L — SIGNIFICANT CHANGE UP (ref 3.5–5.3)
PROCALCITONIN SERPL-MCNC: 0.15 NG/ML — HIGH (ref 0.02–0.1)
PROT SERPL-MCNC: 7.6 G/DL — SIGNIFICANT CHANGE UP (ref 6–8.3)
RBC # BLD: 5.16 M/UL — SIGNIFICANT CHANGE UP (ref 4.2–5.8)
RBC # FLD: 12.5 % — SIGNIFICANT CHANGE UP (ref 10.3–14.5)
SODIUM SERPL-SCNC: 137 MMOL/L — SIGNIFICANT CHANGE UP (ref 135–145)
WBC # BLD: 5.3 K/UL — SIGNIFICANT CHANGE UP (ref 3.8–10.5)
WBC # FLD AUTO: 5.3 K/UL — SIGNIFICANT CHANGE UP (ref 3.8–10.5)

## 2020-04-07 PROCEDURE — 99232 SBSQ HOSP IP/OBS MODERATE 35: CPT

## 2020-04-07 RX ADMIN — Medication 10 MILLILITER(S): at 21:02

## 2020-04-07 RX ADMIN — Medication 10 MILLILITER(S): at 13:11

## 2020-04-07 RX ADMIN — ALBUTEROL 2 PUFF(S): 90 AEROSOL, METERED ORAL at 05:36

## 2020-04-07 RX ADMIN — ENOXAPARIN SODIUM 40 MILLIGRAM(S): 100 INJECTION SUBCUTANEOUS at 18:25

## 2020-04-07 RX ADMIN — ENOXAPARIN SODIUM 40 MILLIGRAM(S): 100 INJECTION SUBCUTANEOUS at 05:36

## 2020-04-07 NOTE — CHART NOTE - NSCHARTNOTEFT_GEN_A_CORE
Nutrition Initial Assessment    Nutrition Consult Received: Yes [   ]  No [X   ]    Reason for Initial Nutrition Assessment: length of stay    Source of Information: Unable to conduct a fact to face interview due to limited contact restrictions related to pt's medical condition and isolation precautions. Information obtained from a phone call with the pt and from the EMR.    Admitting Diagnosis: 44y Male admitted for Suspected [place] coronavirus infection  Difficulty breathing  Noted in chart:  Pt remains stable but still requiring NC 4L as he was yesterday.        PAST MEDICAL & SURGICAL HISTORY:  Prediabetes  Obesity  Moderate persistent asthma  No significant past surgical history      Subjective Information: patient called on telephone 2x no answer.  Noted in Flowsheet PO intake 100%, 50, 75%      GI Issues: last BM 4/7    Current Nutrition Order: Diet, Regular (04-01-20 @ 18:16)    PO Intake:   Good (%) [   ]    Fair (50-75%) [   ]    Poor (<50%) [   ]    Skin Integrity: edema generalized 1+    Labs:   04-07 Na137 mmol/L Glu 78 mg/dL K+ 3.8 mmol/L Cr  0.82 mg/dL BUN 15 mg/dL Phos n/a   Alb 3.9 g/dL PAB n/a         Hemoglobin A1C, Whole Blood: 6.5 % (04-02-20 @ 09:49)  Hemoglobin A1C, Whole Blood: 6.5 % (04-02-20 @ 09:49)      Medications:  MEDICATIONS  (STANDING):  ALBUTerol    90 MICROgram(s) HFA Inhaler 2 Puff(s) Inhalation every 6 hours  enoxaparin Injectable 40 milliGRAM(s) SubCutaneous every 12 hours    MEDICATIONS  (PRN):  acetaminophen   Tablet .. 1000 milliGRAM(s) Oral every 8 hours PRN Temp greater or equal to 38.5C (101.3F)  guaifenesin/dextromethorphan  Syrup 10 milliLiter(s) Oral three times a day PRN Cough    Admitted Anthropometrics:    Height (cm): 180.3 (04-01-20 @ 20:12)    5'11"   Wt  280  lbs    IBW= 172  lbs +/- 10%  Weight (kg): 127.006 (04-01-20 @ 20:12)  BMI (kg/m2): 39.1 (04-01-20 @ 20:12)    Nutrition Focused Physical Exam: Unable to complete due to limited isolation contact precautions at this time.     Estimated Energy Needs (15__ kcal/kg- 20___ kcal/kg):   1905 calories/day   Estimated Protein Needs (_1_ g/kg- _1.2__ g/kg): based on upper AJT=466  lbs (85.9)  86gm-103gm daily    [  ] Nutrition Diagnosis:  [ X ] No active nutrition diagnosis at this time  [  ] Current medical condition precludes nutrition intervention    Goal: pt will continue adequate intake    Nutrition Interventions: none    Recommendations:  continue regular diet  RD to follow-up per protocol.

## 2020-04-07 NOTE — PROGRESS NOTE ADULT - SUBJECTIVE AND OBJECTIVE BOX
[facility] Division of Hospital Medicine Progress Note    Patient is a 44y old M with asthma and obesity presenting with a chief complaint of fever, Dyspnea, Cough and bi opacities on CXR-- COVID + (06 Apr 2020 15:18)      SUBJECTIVE / OVERNIGHT EVENTS:  Stable respiratory wise, feels a little bit more comfortable. Complains that he is unable to inhale well his asthma medications because he gets dizzy when trying.   Continues on 4L NC O2 Sat 92-95%  Complains of diarrhea without abdominal pain.     ADDITIONAL REVIEW OF SYSTEMS:  Negative except above.     MEDICATIONS  (STANDING):  ALBUTerol    90 MICROgram(s) HFA Inhaler 2 Puff(s) Inhalation every 6 hours  enoxaparin Injectable 40 milliGRAM(s) SubCutaneous every 12 hours    MEDICATIONS  (PRN):  acetaminophen   Tablet .. 1000 milliGRAM(s) Oral every 8 hours PRN Temp greater or equal to 38.5C (101.3F)  guaifenesin/dextromethorphan  Syrup 10 milliLiter(s) Oral three times a day PRN Cough      CAPILLARY BLOOD GLUCOSE        I&O's Summary    06 Apr 2020 07:01  -  07 Apr 2020 07:00  --------------------------------------------------------  IN: 420 mL / OUT: 0 mL / NET: 420 mL    07 Apr 2020 07:01  -  07 Apr 2020 14:56  --------------------------------------------------------  IN: 320 mL / OUT: 0 mL / NET: 320 mL        PHYSICAL EXAM:  Vital Signs Last 24 Hrs  T(C): 37.2 (07 Apr 2020 14:25), Max: 37.2 (06 Apr 2020 20:36)  T(F): 99 (07 Apr 2020 14:25), Max: 99 (07 Apr 2020 14:25)  HR: 87 (07 Apr 2020 14:25) (87 - 94)  BP: 120/79 (07 Apr 2020 14:25) (105/71 - 127/73)  BP(mean): --  RR: 20 (07 Apr 2020 14:25) (20 - 20)  SpO2: 95% (07 Apr 2020 14:25) (92% - 95%)    CONSTITUTIONAL: NAD, well-developed, well-groomed  ENMT: Moist oral mucosa  RESPIRATORY: In no resp distress. Lung auscultation deferred.   CARDIOVASCULAR: Deferred  ABDOMEN: Nontender to palpation, normoactive bowel sounds, no rebound/guarding; No hepatosplenomegaly  PSYCH: A+O to person, place, and time; affect appropriate  NEUROLOGY: CN 2-12 are intact and symmetric; no gross sensory deficits   SKIN: No rashes; no palpable lesions    LABS:                        15.0   5.30  )-----------( 276      ( 07 Apr 2020 07:30 )             46.4     04-07    137  |  95<L>  |  15  ----------------------------<  78  3.8   |  27  |  0.82    Ca    9.5      07 Apr 2020 07:30    TPro  7.6  /  Alb  3.9  /  TBili  0.6  /  DBili  x   /  AST  42<H>  /  ALT  44  /  AlkPhos  63  04-07              COVID-19 PCR: Detected (04-01-20 @ 13:09)      RADIOLOGY & ADDITIONAL TESTS:  Imaging from Last 24 Hours:  Electrocardiogram/QTc Interval:    COORDINATION OF CARE:  Care Discussed with Consultants/Other Providers:

## 2020-04-07 NOTE — PROGRESS NOTE ADULT - ATTENDING COMMENTS
Spoke to emergency contact and updated her about pt's condition. Pt remains stable but still requiring NC 4L as he was yesterday

## 2020-04-08 DIAGNOSIS — R04.0 EPISTAXIS: ICD-10-CM

## 2020-04-08 LAB
ALBUMIN SERPL ELPH-MCNC: 3.6 G/DL — SIGNIFICANT CHANGE UP (ref 3.3–5)
ALP SERPL-CCNC: 65 U/L — SIGNIFICANT CHANGE UP (ref 40–120)
ALT FLD-CCNC: 61 U/L — HIGH (ref 10–45)
ANION GAP SERPL CALC-SCNC: 16 MMOL/L — SIGNIFICANT CHANGE UP (ref 5–17)
AST SERPL-CCNC: 48 U/L — HIGH (ref 10–40)
BASOPHILS # BLD AUTO: 0.04 K/UL — SIGNIFICANT CHANGE UP (ref 0–0.2)
BASOPHILS NFR BLD AUTO: 0.7 % — SIGNIFICANT CHANGE UP (ref 0–2)
BILIRUB SERPL-MCNC: 0.6 MG/DL — SIGNIFICANT CHANGE UP (ref 0.2–1.2)
BUN SERPL-MCNC: 12 MG/DL — SIGNIFICANT CHANGE UP (ref 7–23)
CALCIUM SERPL-MCNC: 9 MG/DL — SIGNIFICANT CHANGE UP (ref 8.4–10.5)
CHLORIDE SERPL-SCNC: 93 MMOL/L — LOW (ref 96–108)
CO2 SERPL-SCNC: 26 MMOL/L — SIGNIFICANT CHANGE UP (ref 22–31)
CREAT SERPL-MCNC: 0.77 MG/DL — SIGNIFICANT CHANGE UP (ref 0.5–1.3)
EOSINOPHIL # BLD AUTO: 0.14 K/UL — SIGNIFICANT CHANGE UP (ref 0–0.5)
EOSINOPHIL NFR BLD AUTO: 2.4 % — SIGNIFICANT CHANGE UP (ref 0–6)
GLUCOSE SERPL-MCNC: 88 MG/DL — SIGNIFICANT CHANGE UP (ref 70–99)
HCT VFR BLD CALC: 44.4 % — SIGNIFICANT CHANGE UP (ref 39–50)
HGB BLD-MCNC: 14.8 G/DL — SIGNIFICANT CHANGE UP (ref 13–17)
IMM GRANULOCYTES NFR BLD AUTO: 0.5 % — SIGNIFICANT CHANGE UP (ref 0–1.5)
LDH SERPL L TO P-CCNC: 264 U/L — HIGH (ref 50–242)
LYMPHOCYTES # BLD AUTO: 0.97 K/UL — LOW (ref 1–3.3)
LYMPHOCYTES # BLD AUTO: 16.5 % — SIGNIFICANT CHANGE UP (ref 13–44)
MCHC RBC-ENTMCNC: 29.4 PG — SIGNIFICANT CHANGE UP (ref 27–34)
MCHC RBC-ENTMCNC: 33.3 GM/DL — SIGNIFICANT CHANGE UP (ref 32–36)
MCV RBC AUTO: 88.3 FL — SIGNIFICANT CHANGE UP (ref 80–100)
MONOCYTES # BLD AUTO: 0.7 K/UL — SIGNIFICANT CHANGE UP (ref 0–0.9)
MONOCYTES NFR BLD AUTO: 11.9 % — SIGNIFICANT CHANGE UP (ref 2–14)
NEUTROPHILS # BLD AUTO: 3.99 K/UL — SIGNIFICANT CHANGE UP (ref 1.8–7.4)
NEUTROPHILS NFR BLD AUTO: 68 % — SIGNIFICANT CHANGE UP (ref 43–77)
NRBC # BLD: 0 /100 WBCS — SIGNIFICANT CHANGE UP (ref 0–0)
PLATELET # BLD AUTO: 278 K/UL — SIGNIFICANT CHANGE UP (ref 150–400)
POTASSIUM SERPL-MCNC: 3.6 MMOL/L — SIGNIFICANT CHANGE UP (ref 3.5–5.3)
POTASSIUM SERPL-SCNC: 3.6 MMOL/L — SIGNIFICANT CHANGE UP (ref 3.5–5.3)
PROT SERPL-MCNC: 7.3 G/DL — SIGNIFICANT CHANGE UP (ref 6–8.3)
RBC # BLD: 5.03 M/UL — SIGNIFICANT CHANGE UP (ref 4.2–5.8)
RBC # FLD: 12.5 % — SIGNIFICANT CHANGE UP (ref 10.3–14.5)
SODIUM SERPL-SCNC: 135 MMOL/L — SIGNIFICANT CHANGE UP (ref 135–145)
WBC # BLD: 5.87 K/UL — SIGNIFICANT CHANGE UP (ref 3.8–10.5)
WBC # FLD AUTO: 5.87 K/UL — SIGNIFICANT CHANGE UP (ref 3.8–10.5)

## 2020-04-08 PROCEDURE — 99233 SBSQ HOSP IP/OBS HIGH 50: CPT

## 2020-04-08 RX ADMIN — ALBUTEROL 2 PUFF(S): 90 AEROSOL, METERED ORAL at 17:45

## 2020-04-08 RX ADMIN — ALBUTEROL 2 PUFF(S): 90 AEROSOL, METERED ORAL at 13:00

## 2020-04-08 RX ADMIN — ENOXAPARIN SODIUM 40 MILLIGRAM(S): 100 INJECTION SUBCUTANEOUS at 05:18

## 2020-04-08 RX ADMIN — ENOXAPARIN SODIUM 40 MILLIGRAM(S): 100 INJECTION SUBCUTANEOUS at 17:44

## 2020-04-08 RX ADMIN — ALBUTEROL 2 PUFF(S): 90 AEROSOL, METERED ORAL at 05:18

## 2020-04-08 RX ADMIN — ALBUTEROL 2 PUFF(S): 90 AEROSOL, METERED ORAL at 00:20

## 2020-04-08 NOTE — PROGRESS NOTE ADULT - PROBLEM SELECTOR PLAN 4
- Lovenox 40 mg SQ daily for DVT PPx

## 2020-04-08 NOTE — PROGRESS NOTE ADULT - SUBJECTIVE AND OBJECTIVE BOX
[facility] Division of Hospital Medicine Progress Note    Patient is a 44y old  Male who presents with a chief complaint of Dyspnea and Cough (07 Apr 2020 14:55)      SUBJECTIVE / OVERNIGHT EVENTS:pt states that he is feeling well today. he states he is more comfortable today than yesterday.  O2 sat 92-93% on room air. he is getting up out of bed to chair and bathroom without complaints.  ADDITIONAL REVIEW OF SYSTEMS:    MEDICATIONS  (STANDING):  ALBUTerol    90 MICROgram(s) HFA Inhaler 2 Puff(s) Inhalation every 6 hours  enoxaparin Injectable 40 milliGRAM(s) SubCutaneous every 12 hours    MEDICATIONS  (PRN):  acetaminophen   Tablet .. 1000 milliGRAM(s) Oral every 8 hours PRN Temp greater or equal to 38.5C (101.3F)  guaifenesin/dextromethorphan  Syrup 10 milliLiter(s) Oral three times a day PRN Cough      CAPILLARY BLOOD GLUCOSE        I&O's Summary    07 Apr 2020 07:01  -  08 Apr 2020 07:00  --------------------------------------------------------  IN: 1280 mL / OUT: 0 mL / NET: 1280 mL        PHYSICAL EXAM:  Vital Signs Last 24 Hrs  T(C): 37.1 (08 Apr 2020 13:04), Max: 37.3 (07 Apr 2020 21:56)  T(F): 98.8 (08 Apr 2020 13:04), Max: 99.1 (07 Apr 2020 21:56)  HR: 88 (08 Apr 2020 13:04) (87 - 100)  BP: 118/81 (08 Apr 2020 13:04) (103/71 - 120/82)  BP(mean): --  RR: 20 (08 Apr 2020 13:04) (20 - 20)  SpO2: 93% (08 Apr 2020 13:04) (92% - 96%)  CONSTITUTIONAL: NAD, well-developed, well-groomed  ENMT: Moist oral mucosa, no pharyngeal injection or exudates; normal dentition  RESPIRATORY: Normal respiratory effort; lungs are clear to auscultation bilaterally  CARDIOVASCULAR: Regular rate and rhythm, normal S1 and S2, no murmur/rub/gallop; No lower extremity edema; Peripheral pulses are 2+ bilaterally  ABDOMEN: Nontender to palpation, normoactive bowel sounds, no rebound/guarding; No hepatosplenomegaly  PSYCH: A+O to person, place, and time; affect appropriate  NEUROLOGY: CN 2-12 are intact and symmetric; no gross sensory deficits   SKIN: No rashes; no palpable lesions    LABS:                        14.8   5.87  )-----------( 278      ( 08 Apr 2020 07:29 )             44.4     04-08    135  |  93<L>  |  12  ----------------------------<  88  3.6   |  26  |  0.77    Ca    9.0      08 Apr 2020 07:29    TPro  7.3  /  Alb  3.6  /  TBili  0.6  /  DBili  x   /  AST  48<H>  /  ALT  61<H>  /  AlkPhos  65  04-08              COVID-19 PCR: Detected (04-01-20 @ 13:09)      RADIOLOGY & ADDITIONAL TESTS:  Imaging from Last 24 Hours:  Electrocardiogram/QTc Interval:    COORDINATION OF CARE:  Care Discussed with Consultants/Other Providers: [facility] Division of Hospital Medicine Progress Note    Patient is a 44y old  Male who presents with a chief complaint of Dyspnea and Cough (07 Apr 2020 14:55)      SUBJECTIVE / OVERNIGHT EVENTS:pt states that he is feeling well today. he states he is more comfortable today than yesterday.  O2 sat 92-93% on room air. he is getting up out of bed to chair and bathroom without complaints. he is also complaining dry nares and epistaxis, which is contributing to his desire not to use nasal canula  ADDITIONAL REVIEW OF SYSTEMS:    MEDICATIONS  (STANDING):  ALBUTerol    90 MICROgram(s) HFA Inhaler 2 Puff(s) Inhalation every 6 hours  enoxaparin Injectable 40 milliGRAM(s) SubCutaneous every 12 hours    MEDICATIONS  (PRN):  acetaminophen   Tablet .. 1000 milliGRAM(s) Oral every 8 hours PRN Temp greater or equal to 38.5C (101.3F)  guaifenesin/dextromethorphan  Syrup 10 milliLiter(s) Oral three times a day PRN Cough      CAPILLARY BLOOD GLUCOSE        I&O's Summary    07 Apr 2020 07:01  -  08 Apr 2020 07:00  --------------------------------------------------------  IN: 1280 mL / OUT: 0 mL / NET: 1280 mL        PHYSICAL EXAM:  Vital Signs Last 24 Hrs  T(C): 37.1 (08 Apr 2020 13:04), Max: 37.3 (07 Apr 2020 21:56)  T(F): 98.8 (08 Apr 2020 13:04), Max: 99.1 (07 Apr 2020 21:56)  HR: 88 (08 Apr 2020 13:04) (87 - 100)  BP: 118/81 (08 Apr 2020 13:04) (103/71 - 120/82)  BP(mean): --  RR: 20 (08 Apr 2020 13:04) (20 - 20)  SpO2: 93% (08 Apr 2020 13:04) (92% - 96%)  CONSTITUTIONAL: NAD, well-developed, well-groomed  ENMT: Moist oral mucosa, no pharyngeal injection or exudates; normal dentition  RESPIRATORY: Normal respiratory effort; lungs are clear to auscultation bilaterally  CARDIOVASCULAR: Regular rate and rhythm, normal S1 and S2, no murmur/rub/gallop; No lower extremity edema; Peripheral pulses are 2+ bilaterally  ABDOMEN: Nontender to palpation, normoactive bowel sounds, no rebound/guarding; No hepatosplenomegaly  PSYCH: A+O to person, place, and time; affect appropriate  NEUROLOGY: CN 2-12 are intact and symmetric; no gross sensory deficits   SKIN: No rashes; no palpable lesions    LABS:                        14.8   5.87  )-----------( 278      ( 08 Apr 2020 07:29 )             44.4     04-08    135  |  93<L>  |  12  ----------------------------<  88  3.6   |  26  |  0.77    Ca    9.0      08 Apr 2020 07:29    TPro  7.3  /  Alb  3.6  /  TBili  0.6  /  DBili  x   /  AST  48<H>  /  ALT  61<H>  /  AlkPhos  65  04-08              COVID-19 PCR: Detected (04-01-20 @ 13:09)      RADIOLOGY & ADDITIONAL TESTS:  Imaging from Last 24 Hours:  Electrocardiogram/QTc Interval:    COORDINATION OF CARE:  Care Discussed with Consultants/Other Providers:

## 2020-04-08 NOTE — PROGRESS NOTE ADULT - ASSESSMENT
Mr. Samuels is a 44 year old man with a PMHx of moderate intermittent asthma (2 prior hospitalizations, no history of intubations), morbid obesity, questionable history of pre-diabetes per patient presenting from home for evaluation of worsening shortness of breath, found to have bilateral airspace opacities on CXR, fever, and lymphopenia 2/2 COVID-19 infection with hypoxia.
Mr. Samuels is a 44 year old man with a PMHx of moderate intermittent asthma (2 prior hospitalizations, no history of intubations), morbid obesity, questionable history of pre-diabetes per patient presenting from home for evaluation of worsening shortness of breath, found to have bilateral airspace opacities on CXR, fever, and lymphopenia 2/2 COVID-19 infection with hypoxia.
Mr. Samuels is a 44 year old man with a PMHx of moderate intermittent asthma (2 prior hospitalizations, no history of intubations), morbid obesity, questionable history of pre-diabetes per patient presenting from home for evaluation of worsening shortness of breath, found to have bilateral airspace opacities on CXR, fever, and lymphopenia 2/2 COVID-19 infection with mild hypoxia.
Mr. Samuels is a 44 year old man with moderate intermittent asthma (2 prior hospitalizations, no history of intubations), morbid obesity and questionable history of pre-diabetes per patient presenting from home for evaluation of worsening shortness of breath, found to have bilateral airspace opacities on CXR, fever, and lymphopenia 2/2 COVID-19 infection with hypoxia.
Mr. Samuels is a 44 year old man with moderate intermittent asthma (2 prior hospitalizations, no history of intubations), morbid obesity and questionable history of pre-diabetes per patient presenting from home for evaluation of worsening shortness of breath, found to have bilateral airspace opacities on CXR, fever, and lymphopenia 2/2 COVID-19 infection with hypoxia.
Mr. Samuels is a 44 year old man with moderate persistent asthma (2 prior hospitalizations, no history of intubations, not on asthma controller), obesity and questionable history of pre-diabetes per patient presenting from home for evaluation of worsening shortness of breath, found to have bilateral airspace opacities on CXR, fever, and lymphopenia 2/2 COVID-19 infection with hypoxia. He is now on room air with resting O2 sat in low 90s, breathing comfortably
Mr. Samuels is a 44 year old man with a PMHx of moderate intermittent asthma (2 prior hospitalizations, no history of intubations), morbid obesity, questionable history of pre-diabetes per patient presenting from home for evaluation of worsening shortness of breath, found to have bilateral airspace opacities on CXR, fever, and lymphopenia 2/2 COVID-19 infection with hypoxia.

## 2020-04-08 NOTE — PROGRESS NOTE ADULT - REASON FOR ADMISSION
Dyspnea and Cough
Dyspnea and Cough -- COVID +
Dyspnea and Cough
Dyspnea and Cough

## 2020-04-08 NOTE — PROGRESS NOTE ADULT - PROBLEM SELECTOR PLAN 3
A1c 6.5% but sugars currently acceptable.   - Will consider discharging on metformin  - Endocrine f/u as outpatient

## 2020-04-08 NOTE — PROGRESS NOTE ADULT - PROBLEM SELECTOR PLAN 1
secondary to COVID-19 infection with mild hypoxia  - Supportive care   - Cont Plaquenil course (last day 4/6)  - Albuterol MDI PRN for wheezing  - Incentive spirometry
secondary to COVID-19 infection with mild hypoxia  - Supportive care   - s/p 5 day Plaquenil course (4/6)  - Albuterol inhaled PRN for wheezing  - Incentive spirometry  - lasix as needed
secondary to COVID-19 infection with mild hypoxia  - Supportive care   - s/p 5 day Plaquenil course (4/6)  - Albuterol inhaled PRN for wheezing  - Incentive spirometry  - lasix as needed
secondary to COVID-19 infection with mild hypoxia  - Supportive care   - s/p 5 day Plaquenil course (last day today 4/6)  - Albuterol MDI PRN for wheezing  - Incentive spirometry  - lasix as needed
secondary to COVID-19 infection with mild hypoxia  - Supportive care   - Cont Plaquenil course (last day 4/6)  - Albuterol MDI PRN for wheezing  - Incentive spirometry  - Trial of lasix

## 2020-04-08 NOTE — PROGRESS NOTE ADULT - PROBLEM SELECTOR PROBLEM 2
Moderate persistent asthma, unspecified whether complicated

## 2020-04-08 NOTE — PROGRESS NOTE ADULT - PROBLEM SELECTOR PROBLEM 1
Pneumonia, viral

## 2020-04-08 NOTE — PROGRESS NOTE ADULT - PROBLEM SELECTOR PLAN 2
- Albuterol q4h via MDI; avoid nebulizers
- Albuterol q4h via MDI; avoid nebulizers
- Albuterol q4h via MDI; avoid nebulizers  - If wheezing presents or symptoms not improving, will obtain pulmonary evaluation
- Albuterol q4h via MDI; avoid nebulizers  - patient defers starting asthma controllers at this time. recommend outpatient follow up with PMD or allergy/immunology for asthma management
- Albuterol q4h via MDI; avoid nebulizers  - If wheezing presents or symptoms not improving, will obtain pulmonary evaluation

## 2020-04-08 NOTE — PROGRESS NOTE ADULT - ATTENDING COMMENTS
spoke w/ Toyin Rock (emergency contact) to update her on pt's condition. He is comfortable on room air currently.

## 2020-04-09 ENCOUNTER — TRANSCRIPTION ENCOUNTER (OUTPATIENT)
Age: 45
End: 2020-04-09

## 2020-04-09 VITALS
SYSTOLIC BLOOD PRESSURE: 137 MMHG | OXYGEN SATURATION: 95 % | RESPIRATION RATE: 20 BRPM | HEART RATE: 86 BPM | TEMPERATURE: 99 F | DIASTOLIC BLOOD PRESSURE: 82 MMHG

## 2020-04-09 LAB
ALBUMIN SERPL ELPH-MCNC: 3.6 G/DL — SIGNIFICANT CHANGE UP (ref 3.3–5)
ALP SERPL-CCNC: 64 U/L — SIGNIFICANT CHANGE UP (ref 40–120)
ALT FLD-CCNC: 94 U/L — HIGH (ref 10–45)
ANION GAP SERPL CALC-SCNC: 16 MMOL/L — SIGNIFICANT CHANGE UP (ref 5–17)
AST SERPL-CCNC: 66 U/L — HIGH (ref 10–40)
BASOPHILS # BLD AUTO: 0.03 K/UL — SIGNIFICANT CHANGE UP (ref 0–0.2)
BASOPHILS NFR BLD AUTO: 0.6 % — SIGNIFICANT CHANGE UP (ref 0–2)
BILIRUB SERPL-MCNC: 0.5 MG/DL — SIGNIFICANT CHANGE UP (ref 0.2–1.2)
BUN SERPL-MCNC: 11 MG/DL — SIGNIFICANT CHANGE UP (ref 7–23)
CALCIUM SERPL-MCNC: 9 MG/DL — SIGNIFICANT CHANGE UP (ref 8.4–10.5)
CHLORIDE SERPL-SCNC: 97 MMOL/L — SIGNIFICANT CHANGE UP (ref 96–108)
CO2 SERPL-SCNC: 26 MMOL/L — SIGNIFICANT CHANGE UP (ref 22–31)
CREAT SERPL-MCNC: 0.79 MG/DL — SIGNIFICANT CHANGE UP (ref 0.5–1.3)
EOSINOPHIL # BLD AUTO: 0.14 K/UL — SIGNIFICANT CHANGE UP (ref 0–0.5)
EOSINOPHIL NFR BLD AUTO: 2.6 % — SIGNIFICANT CHANGE UP (ref 0–6)
GLUCOSE SERPL-MCNC: 108 MG/DL — HIGH (ref 70–99)
HCT VFR BLD CALC: 43.7 % — SIGNIFICANT CHANGE UP (ref 39–50)
HGB BLD-MCNC: 14.6 G/DL — SIGNIFICANT CHANGE UP (ref 13–17)
IMM GRANULOCYTES NFR BLD AUTO: 0.4 % — SIGNIFICANT CHANGE UP (ref 0–1.5)
LYMPHOCYTES # BLD AUTO: 1.25 K/UL — SIGNIFICANT CHANGE UP (ref 1–3.3)
LYMPHOCYTES # BLD AUTO: 23.2 % — SIGNIFICANT CHANGE UP (ref 13–44)
MCHC RBC-ENTMCNC: 29.4 PG — SIGNIFICANT CHANGE UP (ref 27–34)
MCHC RBC-ENTMCNC: 33.4 GM/DL — SIGNIFICANT CHANGE UP (ref 32–36)
MCV RBC AUTO: 87.9 FL — SIGNIFICANT CHANGE UP (ref 80–100)
MONOCYTES # BLD AUTO: 0.7 K/UL — SIGNIFICANT CHANGE UP (ref 0–0.9)
MONOCYTES NFR BLD AUTO: 13 % — SIGNIFICANT CHANGE UP (ref 2–14)
NEUTROPHILS # BLD AUTO: 3.24 K/UL — SIGNIFICANT CHANGE UP (ref 1.8–7.4)
NEUTROPHILS NFR BLD AUTO: 60.2 % — SIGNIFICANT CHANGE UP (ref 43–77)
NRBC # BLD: 0 /100 WBCS — SIGNIFICANT CHANGE UP (ref 0–0)
PLATELET # BLD AUTO: 283 K/UL — SIGNIFICANT CHANGE UP (ref 150–400)
POTASSIUM SERPL-MCNC: 3.7 MMOL/L — SIGNIFICANT CHANGE UP (ref 3.5–5.3)
POTASSIUM SERPL-SCNC: 3.7 MMOL/L — SIGNIFICANT CHANGE UP (ref 3.5–5.3)
PROT SERPL-MCNC: 7.1 G/DL — SIGNIFICANT CHANGE UP (ref 6–8.3)
RBC # BLD: 4.97 M/UL — SIGNIFICANT CHANGE UP (ref 4.2–5.8)
RBC # FLD: 12.5 % — SIGNIFICANT CHANGE UP (ref 10.3–14.5)
SODIUM SERPL-SCNC: 139 MMOL/L — SIGNIFICANT CHANGE UP (ref 135–145)
WBC # BLD: 5.38 K/UL — SIGNIFICANT CHANGE UP (ref 3.8–10.5)
WBC # FLD AUTO: 5.38 K/UL — SIGNIFICANT CHANGE UP (ref 3.8–10.5)

## 2020-04-09 PROCEDURE — 93005 ELECTROCARDIOGRAM TRACING: CPT

## 2020-04-09 PROCEDURE — 85652 RBC SED RATE AUTOMATED: CPT

## 2020-04-09 PROCEDURE — 82435 ASSAY OF BLOOD CHLORIDE: CPT

## 2020-04-09 PROCEDURE — 99238 HOSP IP/OBS DSCHRG MGMT 30/<: CPT

## 2020-04-09 PROCEDURE — 82962 GLUCOSE BLOOD TEST: CPT

## 2020-04-09 PROCEDURE — 86140 C-REACTIVE PROTEIN: CPT

## 2020-04-09 PROCEDURE — 85027 COMPLETE CBC AUTOMATED: CPT

## 2020-04-09 PROCEDURE — 85730 THROMBOPLASTIN TIME PARTIAL: CPT

## 2020-04-09 PROCEDURE — 84132 ASSAY OF SERUM POTASSIUM: CPT

## 2020-04-09 PROCEDURE — 85014 HEMATOCRIT: CPT

## 2020-04-09 PROCEDURE — 93010 ELECTROCARDIOGRAM REPORT: CPT

## 2020-04-09 PROCEDURE — 83615 LACTATE (LD) (LDH) ENZYME: CPT

## 2020-04-09 PROCEDURE — 82728 ASSAY OF FERRITIN: CPT

## 2020-04-09 PROCEDURE — 85385 FIBRINOGEN ANTIGEN: CPT

## 2020-04-09 PROCEDURE — 84484 ASSAY OF TROPONIN QUANT: CPT

## 2020-04-09 PROCEDURE — 84295 ASSAY OF SERUM SODIUM: CPT

## 2020-04-09 PROCEDURE — 94640 AIRWAY INHALATION TREATMENT: CPT

## 2020-04-09 PROCEDURE — 80053 COMPREHEN METABOLIC PANEL: CPT

## 2020-04-09 PROCEDURE — 85610 PROTHROMBIN TIME: CPT

## 2020-04-09 PROCEDURE — 85379 FIBRIN DEGRADATION QUANT: CPT

## 2020-04-09 PROCEDURE — 82803 BLOOD GASES ANY COMBINATION: CPT

## 2020-04-09 PROCEDURE — 83036 HEMOGLOBIN GLYCOSYLATED A1C: CPT

## 2020-04-09 PROCEDURE — 82947 ASSAY GLUCOSE BLOOD QUANT: CPT

## 2020-04-09 PROCEDURE — 83605 ASSAY OF LACTIC ACID: CPT

## 2020-04-09 PROCEDURE — 87635 SARS-COV-2 COVID-19 AMP PRB: CPT

## 2020-04-09 PROCEDURE — 99285 EMERGENCY DEPT VISIT HI MDM: CPT

## 2020-04-09 PROCEDURE — 82330 ASSAY OF CALCIUM: CPT

## 2020-04-09 PROCEDURE — 84145 PROCALCITONIN (PCT): CPT

## 2020-04-09 PROCEDURE — 71045 X-RAY EXAM CHEST 1 VIEW: CPT

## 2020-04-09 RX ORDER — GUAIFENESIN/DEXTROMETHORPHAN 600MG-30MG
10 TABLET, EXTENDED RELEASE 12 HR ORAL
Qty: 0 | Refills: 0 | DISCHARGE
Start: 2020-04-09

## 2020-04-09 RX ORDER — ALBUTEROL 90 UG/1
0 AEROSOL, METERED ORAL
Qty: 0 | Refills: 0 | DISCHARGE

## 2020-04-09 RX ORDER — GUAIFENESIN/DEXTROMETHORPHAN 600MG-30MG
10 TABLET, EXTENDED RELEASE 12 HR ORAL
Qty: 210 | Refills: 0
Start: 2020-04-09 | End: 2020-04-15

## 2020-04-09 RX ORDER — ALBUTEROL 90 UG/1
2 AEROSOL, METERED ORAL
Qty: 0 | Refills: 0 | DISCHARGE
Start: 2020-04-09

## 2020-04-09 RX ORDER — FLUTICASONE FUROATE AND VILANTEROL TRIFENATATE 100; 25 UG/1; UG/1
1 POWDER RESPIRATORY (INHALATION)
Qty: 1 | Refills: 0
Start: 2020-04-09 | End: 2020-05-08

## 2020-04-09 RX ORDER — FLUTICASONE FUROATE AND VILANTEROL TRIFENATATE 100; 25 UG/1; UG/1
1 POWDER RESPIRATORY (INHALATION)
Qty: 0 | Refills: 0 | DISCHARGE

## 2020-04-09 RX ADMIN — ALBUTEROL 2 PUFF(S): 90 AEROSOL, METERED ORAL at 12:34

## 2020-04-09 RX ADMIN — ENOXAPARIN SODIUM 40 MILLIGRAM(S): 100 INJECTION SUBCUTANEOUS at 05:28

## 2020-04-09 RX ADMIN — ALBUTEROL 2 PUFF(S): 90 AEROSOL, METERED ORAL at 05:28

## 2020-04-09 RX ADMIN — ALBUTEROL 2 PUFF(S): 90 AEROSOL, METERED ORAL at 00:43

## 2020-04-09 NOTE — DISCHARGE NOTE PROVIDER - NSDCFUADDAPPT_GEN_ALL_CORE_FT
[facility] will contact you about how you are feeling.  Follow up with your Primary care doctor

## 2020-04-09 NOTE — DISCHARGE NOTE PROVIDER - NSDCCPCAREPLAN_GEN_ALL_CORE_FT
PRINCIPAL DISCHARGE DIAGNOSIS  Diagnosis: Pneumonia, viral  Assessment and Plan of Treatment: You tested positive for COVID 19.  You no longer require hospitalization.  Please restrict activities outside of your home except for getting medical care.  Do not go to work, school, or public areas.  Avoid using public transportation, ride-sharing, or taxis.  Separate yourself from other people and animals in your home.  Call ahead before visiting your doctor.  Wear a facemask when you are around other people. Cover your cough and sneezes.  Clean your hands often.  Avoid sharing personal household items.  Clean all frequently touched surfaces daily.      SECONDARY DISCHARGE DIAGNOSES  Diagnosis: Hyperglycemia  Assessment and Plan of Treatment: 4/2: HgbA1c- 6.5  Follow up with your Primary care doctor.    Diagnosis: Moderate persistent asthma, unspecified whether complicated  Assessment and Plan of Treatment: Continue wtih your home inhaler PRINCIPAL DISCHARGE DIAGNOSIS  Diagnosis: Pneumonia, viral  Assessment and Plan of Treatment: You tested positive for COVID 19.  You no longer require hospitalization.  Please restrict activities outside of your home except for getting medical care.  Do not go to work, school, or public areas.  Avoid using public transportation, ride-sharing, or taxis.  Separate yourself from other people and animals in your home.  Call ahead before visiting your doctor.  Wear a facemask when you are around other people. Cover your cough and sneezes.  Clean your hands often.  Avoid sharing personal household items.  Clean all frequently touched surfaces daily.      SECONDARY DISCHARGE DIAGNOSES  Diagnosis: Elevated hemoglobin A1c measurement  Assessment and Plan of Treatment: 4/2: HgbA1c- 6.5. Repeat this test in 3 months.   Follow up with your Primary care doctor.    Diagnosis: Moderate persistent asthma, unspecified whether complicated  Assessment and Plan of Treatment: Continue wtih your home inhaler

## 2020-04-09 NOTE — DISCHARGE NOTE NURSING/CASE MANAGEMENT/SOCIAL WORK - PATIENT PORTAL LINK FT
You can access the FollowMyHealth Patient Portal offered by [facility] by registering at the following website: [email or web address]. By joining [employer]’s FollowMyHealth portal, you will also be able to view your health information using other applications (apps) compatible with our system.

## 2020-04-09 NOTE — DISCHARGE NOTE PROVIDER - PROVIDER TOKENS
FREE:[LAST:[Dr. Arriaga],FIRST:[Daya],PHONE:[(   )    -],FAX:[(   )    -],ADDRESS:[Primary care doctor]]

## 2020-04-09 NOTE — DISCHARGE NOTE PROVIDER - HOSPITAL COURSE
Mr. Samuels is a 44 year old man with moderate persistent asthma (2 prior hospitalizations, no history of intubations, not on asthma controller), obesity and questionable history of pre-diabetes per patient presenting from home for evaluation of worsening shortness of breath, found to have bilateral airspace opacities on CXR, fever, and lymphopenia 2/2 COVID-19 infection with hypoxia. He is now on room air with resting O2 sat in low 90s, breathing comfortably         Pneumonia, viral.  secondary to COVID-19 infection with mild hypoxia    - Supportive care     - s/p 5 day Plaquenil course (4/6)    - Albuterol inhaled PRN for wheezing            Moderate persistent asthma, unspecified whether complicated.  - Albuterol q4h via MDI; avoid nebulizers    - patient defers starting asthma controllers at this time. recommend outpatient follow up with PMD or allergy/immunology for asthma management.         R/O Diabetes. A1c 6.5% but sugars currently acceptable.     - PCP/ Endocrine f/u as outpatient.

## 2020-04-09 NOTE — DISCHARGE NOTE PROVIDER - NSDCMRMEDTOKEN_GEN_ALL_CORE_FT
albuterol 90 mcg/inh inhalation aerosol: 2 puff(s) inhaled every 6 hours, As Needed  Breo Ellipta: 1 puff(s) inhaled once a day (at bedtime)      guaifenesin-dextromethorphan 100 mg-10 mg/5 mL oral liquid: 10 milliliter(s) orally 3 times a day, As needed, Cough  Tylenol Extra Strength 500 mg oral tablet: 2 tab(s) orally every 6 hours, As Needed  Vitamin D2 50,000 intl units (1.25 mg) oral capsule: 1 cap(s) orally once a week albuterol 90 mcg/inh inhalation aerosol: 2 puff(s) inhaled every 6 hours, As Needed  Breo Ellipta 100 mcg-25 mcg/inh inhalation powder: 1 puff(s) inhaled once a day   guaifenesin-dextromethorphan 100 mg-10 mg/5 mL oral liquid: 10 milliliter(s) orally 3 times a day, As needed, Cough  Tylenol Extra Strength 500 mg oral tablet: 2 tab(s) orally every 6 hours, As Needed  Vitamin D2 50,000 intl units (1.25 mg) oral capsule: 1 cap(s) orally once a week

## 2020-04-09 NOTE — CHART NOTE - NSCHARTNOTEFT_GEN_A_CORE
pt seen and examined this AM  He has been feeling well, doing well on RA. O2 sat on RA is 92-94% sedentary, 95% while ambulating.  His physical exam is unremarkable.  He wants to go home and is ready to do so.  Plan to discharge him home later today.

## 2020-05-01 NOTE — CHART NOTE - NSCHARTNOTEFT_GEN_A_CORE
This pt was admitted with hyoxemia 2/2 COVID PNA and was treated with plaquenil. He presented c/o fever, cough, shortness of breath and myalgia. On admission, he was febrile, tachycardic, tachypneic with hypoxemia and had elevated inflammatory markers. This was likely due to his underlying COVID19 viral syndrome.

## 2020-08-11 NOTE — ED PROVIDER NOTE - CARDIAC, MLM
Procedure To Be Performed At Next Visit: Excision
Introduction Text (Please End With A Colon): The following procedure was deferred:excision
Detail Level: Detailed
S1 S2 tachy

## 2021-04-24 NOTE — ED ADULT TRIAGE NOTE - TEMPERATURE IN FAHRENHEIT (DEGREES F)
ED Psychiatric EmPATH Note  [facility] Emergency Department - EmPATH Unit    Hoang Kiser MRN: [id number]   Age: 36 year old YOB: 1984     History     Chief Complaint   Patient presents with     Paranoid     HPI  Hoang Kiser is a 36 year old male with PMH notable for depression, ADHD, PTSD, JIN, stimulant use disorder, benzodiazepine abuse, opioid use disorder-on maintenance therapy admitted to the ED Observation Unit with paranoia in the context of recent methamphetamine use and also tingling sensation in his extremities.  Currently anticoagulated with Eliquis due to DVT in right leg diagnosed in January 2021.  Medical work-up with labs and ultrasound yesterday in the emergency room unremarkable for any further clotting complications.  Of note patient had recent psychiatric hospitalization 3/25/2021 through 3/29/2021 for suicidal ideation and disruptive behavior in his group home.  Patient currently maintained on the following psychiatric medications: Adderall, buprenorphine, clonidine, hydroxyzine, Viibryd, Remeron.    Patient with long history of substance use struggles.  Patient is followed by psychiatry, Dr. Brian Duckworth, and also addiction medicine, Dr. Nicholson.  Patient reportedly has had a couple relapses on methamphetamine recently.  Reported to the emergency room physician he last used methamphetamine a couple days ago.  Patient is not sure what triggered his use.  He reports concerns regarding electricity in his group home.  Feels like it is leaking out of the HVAC system and affecting his body.  He states he felt this way even before using meth a few days ago.  He does not think anyone is trying to harm him with the electricity.  He states he thinks it is inappropriately wired.  He denies any auditory and visual hallucinations.  He does not endorse any other paranoia or delusions.  He does admit he does not get along with the group home staff member who is working last night at his  "[employer].  Reports he was called \"a white ni**er\" by the staff person.  Patient states he feels comfortable talking to the group home staff member who works today/on Saturdays.  He is agreeable to pursuing his concerns regarding the electricity issues by using the appropriate pathway, i.e. not cutting the cords or holding live wires.  He states he will alert staff members.  He denies feeling depressed.  Anxiety is manageable.  He denies suicidal ideation.  He denies thoughts of harming anyone else.  He does not think his recent drug use has been problematic.  He is open to therapy.  He reports he has been medication adherent.  He also reports that all of his physical symptoms he was concerned about have resolved.    Per recent ED note by emergency room provider Dr. Jimenez 4/24/2021:  Hoang Kiser is a 36 year old male with history of depression, PTSD, and substance abuse who presents via EMS with paranoia.  The patient reports he is concerned about electricity leaking out of the HVAC system at his group home, causing waves of pain up and down his arms and legs.  He also describes feeling his extremities are more full than usual.  He called 911 tonight as staff would not allow him to turn off the circuit breaker and he feels unsafe in his room.  He denies any thoughts of harming himself or others.  He has been compliant with his medications.  He last used Methamphetamine 2 days ago.  He denies any recent alcohol use.    Past Medical History  Past Medical History:   Diagnosis Date     Arthritis 2/27/2018     Benign positional vertigo 12/2016    Started after my groin/back injury. Sitting on Toilet.     Depressive disorder 2003    I am on 120mg of Duoluxetine.     Dysphonia 2014    Nothing significant.     Gastroesophageal reflux disease 2004    Occassional. Spicy foods set it off.     Hearing problem 2015    Theorized Diag: Essential Palatal Myoclonus     History of electroencephalogram 4/10/2019    EEG     " Procedure Date: 2019    EEG #:  .      DATE OF EE2019.    SOURCE FILE DURATION:  15 minutes.  This EEG did not have a video.    PATIENT INFORMATION:  The patient is a 34-year-old male with irregular movements.  It is not entirely clear the etiology of these movements.  EEG is being done to evaluate for seizures.    MEDICATIONS:  Adderall, doxepin, Cymbalta, Robaxin.      History of MRI of brain and brain stem 2015    MR BRAIN W/O & W CONTRAST, 2015  Impression: No suspicious intracranial findings.      Hoarseness     Dry mouth, started after taking Tizanidine     Neuralgia, neuritis, and radiculitis, unspecified 2012     normal emg 2017 2017    Interpretation: This is a normal study. There is no electrophysiologic evidence of a lumbosacral radiculopathy affecting the right or left lower extremity on the basis of this study.    Rodney Mena MD Department of Neurology       Panic attack 2016     Seizures (H)     Grew out of it. Absence Seizures. [id number]     Tinnitus     Had it most of my life. Got worse in      Past Surgical History:   Procedure Laterality Date     COLONOSCOPY  02/15/18    Has not happened yet.     COLONOSCOPY N/A 2/15/2018    Procedure: COMBINED COLONOSCOPY, SINGLE OR MULTIPLE BIOPSY/POLYPECTOMY BY BIOPSY;;  Surgeon: Liam Kincaid MD;  Location: [facility] OR     COLONOSCOPY WITH CO2 INSUFFLATION N/A 2/15/2018    Procedure: COLONOSCOPY WITH CO2 INSUFFLATION;  COLON-FAMILY HX OF COLON CANCER/ SYPURA;  Surgeon: Liam Kincaid MD;  Location: [facility] OR     HC TOOTH EXTRACTION W/FORCEP Bilateral      PE TUBES       ADDERALL XR 30 MG 24 hr capsule  albuterol (PROAIR HFA/PROVENTIL HFA/VENTOLIN HFA) 108 (90 Base) MCG/ACT inhaler  Ayr Saline Nasal No-Drip GEL  buprenorphine HCl-naloxone HCl (SUBOXONE) 8-2 MG per film  cloNIDine (CATAPRES) 0.1 MG tablet  hydrochlorothiazide (HYDRODIURIL) 25 MG tablet  hydrOXYzine (ATARAX) 25 MG  tablet  methylfolate (DEPLIN) 15 MG TABS tablet  naloxone (NARCAN) 4 MG/0.1ML nasal spray  nicotine (NICODERM CQ) 7 MG/24HR 24 hr patch  nicotine (NICORETTE) 4 MG lozenge  order for DME  order for DME  rivaroxaban ANTICOAGULANT (XARELTO ANTICOAGULANT) 20 MG TABS tablet  tiZANidine (ZANAFLEX) 4 MG tablet  vilazodone (VIIBRYD) 10 MG TABS tablet  vilazodone (VIIBRYD) 20 MG TABS tablet  zolpidem (AMBIEN) 10 MG tablet      Allergies   Allergen Reactions     Amitriptyline      Ineffective in reducing spasms/movement, increased fatigue  Other reaction(s): Other (see comments)     Buspirone Hcl Other (See Comments)     Panic attacks     Doxycycline Diarrhea, Fatigue, GI Disturbance and Nausea     Other reaction(s): GI intolerance     Trazodone Fatigue     Other reaction(s): Other (see comments)     Cephalexin Diarrhea     Other reaction(s): GI intolerance     Family History  Family History   Problem Relation Age of Onset     Lipids Father         hyperlipidemia     Hyperlipidemia Father      Obesity Father      Arthritis Mother      Hyperlipidemia Mother      Depression Mother      Anxiety Disorder Mother      Mental Illness Mother      Obesity Mother      Asthma Brother      Asthma Sister      Depression Other      Hearing Loss Other      Psychotic Disorder Other      Obesity Other      Cerebrovascular Disease Paternal Grandfather      Alzheimer Disease Paternal Grandfather      Depression Brother      Mental Illness Brother         Bipolar     Asthma Brother      Colitis Brother      Skin Cancer Brother      Depression Sister      Asthma Sister      Substance Abuse Sister         Alcohol     Mental Illness Brother         Bipolar     Asthma Brother      Colitis Brother      Asthma Sister      Obesity Sister      Asthma Brother      Obesity Brother      Obesity Maternal Grandmother      Genetic Disorder Maternal Grandmother         Epilepsy     Obesity Paternal Grandmother      Colon Cancer Other      Genetic Disorder  Other         Cerebral Palsy     Genetic Disorder Maternal Grandfather         Multiple Sclerosis     Genetic Disorder Other         Epilepsy     Social History   Social History     Tobacco Use     Smoking status: Current Every Day Smoker     Packs/day: 0.50     Years: 10.00     Pack years: 5.00     Types: Cigarettes     Start date: 1/1/2002     Smokeless tobacco: Never Used     Tobacco comment: Transdermal patches have helped me the most in the past   Substance Use Topics     Alcohol use: No     Alcohol/week: 2.0 - 4.0 standard drinks     Comment: rarely     Drug use: No     Comment: hx of meth, none since 2015. Relapse in 2020. 12-10-20 = 2.5 months sober      Past medical history, past surgical history, medications, allergies, family history, and social history were reviewed with the patient. No additional pertinent items.       Review of Systems  A complete review of systems was performed with pertinent positives and negatives noted in the HPI, and all other systems negative.    Physical Examination   BP: (!) 118/95  Pulse: 80  Temp: 98.5  F (36.9  C)  Resp: 12  Weight: 85.3 kg (188 lb)  SpO2: 96 %    Physical Exam  General: Appears quite sleepy. Appears older than stated age.   Neuro:  oriented to person place situation. CN II-XII grossly intact by observation. Grossly normal strength in all extremities by observation.   Integumentary/Skin: no rash visualized, normal color    Psychiatric Examination   Appearance: Appears quite sleepy, patient asleep upright in the recliner upon my approach, appears older than stated age, wearing spiced color scrubs, mildly disheveled  Attitude:  cooperative  Eye Contact:  fair  Mood:  good  Affect:  Restricted  Speech:  clear, coherent and normal prosody  Psychomotor Behavior:  no evidence of tardive dyskinesia, dystonia, or tics  Throught Process:  Brief but overall linear and goal oriented  Associations:  no loose associations  Thought Content:  no evidence of suicidal  "ideation or homicidal ideation, no auditory hallucinations present, no visual hallucinations present and Positive for paranoia regarding electricity, see HPI above  Insight:  fair  Judgement:  fair  Oriented to:  time, person, and place  Attention Span and Concentration:  intact  Recent and Remote Memory:  fair    ED Course        Labs Ordered and Resulted from Time of ED Arrival Up to the Time of Departure from the ED   SARS-COV-2 (COVID-19) VIRUS RT-PCR       Assessments & Plan (with Medical Decision Making)   Patient presenting with past psychiatric history including depression, ADHD, PTSD, JIN, stimulant use disorder, benzodiazepine abuse, opioid use disorder-on maintenance therapy admitted to the ED Observation Unit with paranoia in the context of recent methamphetamine use and also tingling sensation in his extremities. Nursing notes reviewed.  Patient's paranoia likely due to to recent methamphetamine abuse although patient's group home staff states he seems to have hallucinations and delusions/paranoia at baseline.  Also taking prescribed stimulant medication which could be contributing to his symptoms.  Patient quite sleepy today but with improving paranoia.  Still present but does not seem to be as distressing.  Patient's physical symptoms of numbness and tingling have also resolved.  He did also report conflict with the group staff member who was working last night.  He trusts the group home staff member on during the day today and will address his concerns with that person.  He was agreeable to addressing his concerns regarding electricity appropriately with staff members and not by cutting wires/cords or holding \"live wires.\"  Patient is also on Suboxone.      Patient declined chemical dependency resources such as rule 25 and treatment options.  It was recommended the patient follow-up with his outpatient psychiatrist as well as his outpatient addiction medicine provider.  He was agreeable to therapy " and future appointment set up prior to discharge.  He is also advised to maintain sobriety from all mood altering substances.    Patient safe and agreeable to discharge back to group home at this time.  His mood is reported to be not depressed, anxiety manageable, denies suicidal ideation, denies homicidal ideation.    I have reviewed the DEC assessment completed by Tiny Goldman [facility], dated 4/24/2021.    Preliminary diagnosis:  Paranoia/psychosis, unspecified (likely substance induced versus other)  Stimulant/methamphetamine use disorder  Hx of JIN  Hx of PTSD  Hx of ADHD  Hx of Depression  --  DO REI [facility] EMERGENCY DEPT  EmPATH Unit  4/24/2021        Carlotta Pedroza DO  04/24/21 [id number]     98.8

## 2022-01-05 NOTE — ED ADULT NURSE NOTE - ISOLATION PROVIDED EDUCATION
Patient
PAST SURGICAL HISTORY:  History of colonoscopy (2014)    Perianal fistula repair in 2002    S/P ORIF (open reduction internal fixation) fracture (Right ankle, 2014)

## 2022-01-21 NOTE — ED ADULT TRIAGE NOTE - HEART RATE (BEATS/MIN)
Orders place.     Annie Hewitt LPN    
Patient is coming for a 3 hour GTT, please place future orders ASAP.  Thanks!  
115

## 2022-01-29 NOTE — PROVIDER CONTACT NOTE (OTHER) - BACKGROUND
Admitted with (+) COVID. PMH of obesity, prediabetes. [NI] : Endocrine [Nl] : Hematologic/Lymphatic [Wgt Loss (___ Lbs)] : recent [unfilled] lb weight loss [Decrease In Appetite] : decreased appetite [Abdominal Pain] : abdominal pain [Constipation] : constipation [Heartburn] : heartburn

## 2022-12-19 PROBLEM — E66.9 OBESITY, UNSPECIFIED: Chronic | Status: ACTIVE | Noted: 2020-04-01

## 2022-12-19 PROBLEM — J45.40 MODERATE PERSISTENT ASTHMA, UNCOMPLICATED: Chronic | Status: ACTIVE | Noted: 2020-04-01

## 2022-12-19 PROBLEM — R73.03 PREDIABETES: Chronic | Status: ACTIVE | Noted: 2020-04-01

## 2023-02-09 NOTE — ED PROVIDER NOTE - INTERPRETATION
no loss of consciousness, no gait abnormality, no headache, no sensory deficits, and no weakness.
sinus tachy
Consent (Spinal Accessory)/Introductory Paragraph: The rationale for Mohs was explained to the patient and consent was obtained. The risks, benefits and alternatives to therapy were discussed in detail. Specifically, the risks of damage to the spinal accessory nerve, infection, scarring, bleeding, prolonged wound healing, incomplete removal, allergy to anesthesia, and recurrence were addressed. Prior to the procedure, the treatment site was clearly identified and confirmed by the patient. All components of Universal Protocol/PAUSE Rule completed.

## 2023-03-01 ENCOUNTER — APPOINTMENT (OUTPATIENT)
Dept: INTERNAL MEDICINE | Facility: CLINIC | Age: 48
End: 2023-03-01
Payer: MEDICAID

## 2023-03-01 ENCOUNTER — RX CHANGE (OUTPATIENT)
Age: 48
End: 2023-03-01

## 2023-03-01 VITALS
TEMPERATURE: 98.3 F | OXYGEN SATURATION: 98 % | BODY MASS INDEX: 40.32 KG/M2 | HEART RATE: 101 BPM | WEIGHT: 288 LBS | HEIGHT: 71 IN | DIASTOLIC BLOOD PRESSURE: 97 MMHG | SYSTOLIC BLOOD PRESSURE: 148 MMHG

## 2023-03-01 DIAGNOSIS — Z23 ENCOUNTER FOR IMMUNIZATION: ICD-10-CM

## 2023-03-01 DIAGNOSIS — Z83.3 FAMILY HISTORY OF DIABETES MELLITUS: ICD-10-CM

## 2023-03-01 DIAGNOSIS — Z80.0 FAMILY HISTORY OF MALIGNANT NEOPLASM OF DIGESTIVE ORGANS: ICD-10-CM

## 2023-03-01 PROCEDURE — 90715 TDAP VACCINE 7 YRS/> IM: CPT

## 2023-03-01 PROCEDURE — 99386 PREV VISIT NEW AGE 40-64: CPT | Mod: 25

## 2023-03-01 PROCEDURE — 90471 IMMUNIZATION ADMIN: CPT

## 2023-03-01 NOTE — ASSESSMENT
[FreeTextEntry1] : 1)   CPE\par \par -  diet / exercise  discussed \par - check labs\par - needs colonoscopy \par - needs Tdap \par \par 2) HTN \par - start norvasc\par - check lipid /BMP \par - f/u in 4 week\par \par 3) asthma\par - using rescue inhaler BID \par - start Flovent \par \par 4) h/o symp consistent w/ VERONIKA\par - snoring / apneic episodes ( witnessed) / daytime somnolence\par - sleep study advised

## 2023-03-01 NOTE — PHYSICAL EXAM
[No Acute Distress] : no acute distress [Well Nourished] : well nourished [Well Developed] : well developed [Normal Sclera/Conjunctiva] : normal sclera/conjunctiva [Normal Outer Ear/Nose] : the outer ears and nose were normal in appearance [No Edema] : there was no peripheral edema [Normal] : normal gait, coordination grossly intact, no focal deficits and deep tendon reflexes were 2+ and symmetric

## 2023-03-01 NOTE — HISTORY OF PRESENT ILLNESS
[FreeTextEntry1] : CPE \par \par  has a h/o HTN / asthma \par \par out of meds for months\par reports that he has been using mom's inhaler \par \par has been off of amlodipine x 1 month

## 2023-03-01 NOTE — HEALTH RISK ASSESSMENT
[Good] : ~his/her~  mood as  good [Yes] : Yes [Monthly or less (1 pt)] : Monthly or less (1 point) [0] : 2) Feeling down, depressed, or hopeless: Not at all (0) [de-identified] : none [de-identified] : regular  [Change in mental status noted] : No change in mental status noted [None] : None [Alone] : lives alone [Unemployed] : unemployed [Single] : single [Sexually Active] : sexually active [Feels Safe at Home] : Feels safe at home [Reports changes in hearing] : Reports no changes in hearing [Reports changes in vision] : Reports no changes in vision [Reports normal functional visual acuity (ie: able to read med bottle)] : Reports normal functional visual acuity [Reports changes in dental health] : Reports no changes in dental health [Smoke Detector] : smoke detector [Carbon Monoxide Detector] : carbon monoxide detector [Safety elements used in home] : safety elements used in home [Seat Belt] :  uses seat belt [ColonoscopyDate] : need [Never] : Never

## 2023-03-02 LAB
ALBUMIN SERPL ELPH-MCNC: 4.7 G/DL
ALP BLD-CCNC: 100 U/L
ALT SERPL-CCNC: 39 U/L
ANION GAP SERPL CALC-SCNC: 15 MMOL/L
AST SERPL-CCNC: 22 U/L
BASOPHILS # BLD AUTO: 0.03 K/UL
BASOPHILS NFR BLD AUTO: 0.3 %
BILIRUB SERPL-MCNC: 0.4 MG/DL
BUN SERPL-MCNC: 17 MG/DL
CALCIUM SERPL-MCNC: 10 MG/DL
CHLORIDE SERPL-SCNC: 103 MMOL/L
CHOLEST SERPL-MCNC: 209 MG/DL
CO2 SERPL-SCNC: 24 MMOL/L
CREAT SERPL-MCNC: 0.79 MG/DL
EGFR: 111 ML/MIN/1.73M2
EOSINOPHIL # BLD AUTO: 0.05 K/UL
EOSINOPHIL NFR BLD AUTO: 0.5 %
ESTIMATED AVERAGE GLUCOSE: 148 MG/DL
GLUCOSE SERPL-MCNC: 132 MG/DL
HBA1C MFR BLD HPLC: 6.8 %
HCT VFR BLD CALC: 47.8 %
HDLC SERPL-MCNC: 74 MG/DL
HGB BLD-MCNC: 15.2 G/DL
IMM GRANULOCYTES NFR BLD AUTO: 0.3 %
LDLC SERPL CALC-MCNC: 123 MG/DL
LYMPHOCYTES # BLD AUTO: 0.8 K/UL
LYMPHOCYTES NFR BLD AUTO: 7.9 %
MAN DIFF?: NORMAL
MCHC RBC-ENTMCNC: 29.6 PG
MCHC RBC-ENTMCNC: 31.8 GM/DL
MCV RBC AUTO: 93 FL
MONOCYTES # BLD AUTO: 0.44 K/UL
MONOCYTES NFR BLD AUTO: 4.4 %
NEUTROPHILS # BLD AUTO: 8.72 K/UL
NEUTROPHILS NFR BLD AUTO: 86.6 %
NONHDLC SERPL-MCNC: 136 MG/DL
PLATELET # BLD AUTO: 253 K/UL
POTASSIUM SERPL-SCNC: 4.6 MMOL/L
PROT SERPL-MCNC: 7.1 G/DL
RBC # BLD: 5.14 M/UL
RBC # FLD: 13.5 %
SODIUM SERPL-SCNC: 142 MMOL/L
TRIGL SERPL-MCNC: 63 MG/DL
TSH SERPL-ACNC: 0.41 UIU/ML
WBC # FLD AUTO: 10.07 K/UL

## 2023-05-30 ENCOUNTER — RX RENEWAL (OUTPATIENT)
Age: 48
End: 2023-05-30

## 2023-10-10 ENCOUNTER — RX RENEWAL (OUTPATIENT)
Age: 48
End: 2023-10-10

## 2023-10-17 ENCOUNTER — RX RENEWAL (OUTPATIENT)
Age: 48
End: 2023-10-17

## 2023-11-28 ENCOUNTER — RX RENEWAL (OUTPATIENT)
Age: 48
End: 2023-11-28

## 2023-11-28 RX ORDER — ALBUTEROL SULFATE 90 UG/1
108 (90 BASE) INHALANT RESPIRATORY (INHALATION)
Qty: 1 | Refills: 3 | Status: ACTIVE | COMMUNITY
Start: 2023-03-01 | End: 1900-01-01

## 2024-02-16 ENCOUNTER — RX RENEWAL (OUTPATIENT)
Age: 49
End: 2024-02-16

## 2024-02-16 RX ORDER — AMLODIPINE BESYLATE 5 MG/1
5 TABLET ORAL
Qty: 30 | Refills: 2 | Status: ACTIVE | COMMUNITY
Start: 2023-03-01 | End: 1900-01-01

## 2024-06-26 ENCOUNTER — APPOINTMENT (OUTPATIENT)
Dept: INTERNAL MEDICINE | Facility: CLINIC | Age: 49
End: 2024-06-26
Payer: MEDICAID

## 2024-06-26 VITALS
DIASTOLIC BLOOD PRESSURE: 95 MMHG | SYSTOLIC BLOOD PRESSURE: 153 MMHG | TEMPERATURE: 98.1 F | HEIGHT: 71 IN | HEART RATE: 83 BPM | WEIGHT: 292 LBS | OXYGEN SATURATION: 98 % | BODY MASS INDEX: 40.88 KG/M2

## 2024-06-26 DIAGNOSIS — E11.9 TYPE 2 DIABETES MELLITUS W/OUT COMPLICATIONS: ICD-10-CM

## 2024-06-26 DIAGNOSIS — I10 ESSENTIAL (PRIMARY) HYPERTENSION: ICD-10-CM

## 2024-06-26 DIAGNOSIS — Z00.00 ENCOUNTER FOR GENERAL ADULT MEDICAL EXAMINATION W/OUT ABNORMAL FINDINGS: ICD-10-CM

## 2024-06-26 DIAGNOSIS — E78.5 HYPERLIPIDEMIA, UNSPECIFIED: ICD-10-CM

## 2024-06-26 DIAGNOSIS — D17.1 BENIGN LIPOMATOUS NEOPLASM OF SKIN AND SUBCUTANEOUS TISSUE OF TRUNK: ICD-10-CM

## 2024-06-26 DIAGNOSIS — J45.31 MILD PERSISTENT ASTHMA WITH (ACUTE) EXACERBATION: ICD-10-CM

## 2024-06-26 DIAGNOSIS — G47.33 OBSTRUCTIVE SLEEP APNEA (ADULT) (PEDIATRIC): ICD-10-CM

## 2024-06-26 PROCEDURE — 99214 OFFICE O/P EST MOD 30 MIN: CPT

## 2024-06-26 PROCEDURE — 36415 COLL VENOUS BLD VENIPUNCTURE: CPT

## 2024-06-26 RX ORDER — FLUTICASONE PROPIONATE 220 UG/1
220 AEROSOL, METERED RESPIRATORY (INHALATION) TWICE DAILY
Qty: 3 | Refills: 3 | Status: DISCONTINUED | COMMUNITY
Start: 2023-03-01 | End: 2024-06-26

## 2024-06-26 RX ORDER — BUDESONIDE, GLYCOPYRROLATE, AND FORMOTEROL FUMARATE 160; 9; 4.8 UG/1; UG/1; UG/1
160-9-4.8 AEROSOL, METERED RESPIRATORY (INHALATION) TWICE DAILY
Qty: 3 | Refills: 0 | Status: ACTIVE | COMMUNITY
Start: 2024-06-26 | End: 1900-01-01

## 2024-06-26 RX ORDER — BECLOMETHASONE DIPROPIONATE HFA 80 UG/1
80 AEROSOL, METERED RESPIRATORY (INHALATION)
Qty: 1 | Refills: 3 | Status: DISCONTINUED | COMMUNITY
Start: 2023-03-01 | End: 2024-06-26

## 2024-06-26 RX ORDER — ALBUTEROL SULFATE 90 UG/1
108 (90 BASE) INHALANT RESPIRATORY (INHALATION)
Qty: 1 | Refills: 1 | Status: ACTIVE | COMMUNITY
Start: 2024-06-26

## 2024-06-27 LAB
ALBUMIN SERPL ELPH-MCNC: 4.7 G/DL
ALP BLD-CCNC: 133 U/L
ALT SERPL-CCNC: 37 U/L
ANION GAP SERPL CALC-SCNC: 12 MMOL/L
AST SERPL-CCNC: 28 U/L
BILIRUB SERPL-MCNC: 0.3 MG/DL
BUN SERPL-MCNC: 14 MG/DL
CALCIUM SERPL-MCNC: 9.2 MG/DL
CHLORIDE SERPL-SCNC: 106 MMOL/L
CHOLEST SERPL-MCNC: 142 MG/DL
CO2 SERPL-SCNC: 24 MMOL/L
CREAT SERPL-MCNC: 0.83 MG/DL
EGFR: 109 ML/MIN/1.73M2
ESTIMATED AVERAGE GLUCOSE: 128 MG/DL
GLUCOSE SERPL-MCNC: 100 MG/DL
HBA1C MFR BLD HPLC: 6.1 %
HCT VFR BLD CALC: 43.4 %
HDLC SERPL-MCNC: 61 MG/DL
HGB BLD-MCNC: 14.4 G/DL
LDLC SERPL CALC-MCNC: 68 MG/DL
MCHC RBC-ENTMCNC: 29.6 PG
MCHC RBC-ENTMCNC: 33.2 GM/DL
MCV RBC AUTO: 89.1 FL
NONHDLC SERPL-MCNC: 82 MG/DL
PLATELET # BLD AUTO: 210 K/UL
POTASSIUM SERPL-SCNC: 4.6 MMOL/L
PROT SERPL-MCNC: 7.4 G/DL
RBC # BLD: 4.87 M/UL
RBC # FLD: 14.3 %
SODIUM SERPL-SCNC: 142 MMOL/L
TRIGL SERPL-MCNC: 65 MG/DL
TSH SERPL-ACNC: 1.34 UIU/ML
WBC # FLD AUTO: 8.4 K/UL

## 2024-09-05 ENCOUNTER — APPOINTMENT (OUTPATIENT)
Dept: MRI IMAGING | Facility: CLINIC | Age: 49
End: 2024-09-05

## 2024-10-09 ENCOUNTER — APPOINTMENT (OUTPATIENT)
Dept: INTERNAL MEDICINE | Facility: CLINIC | Age: 49
End: 2024-10-09
Payer: MEDICAID

## 2024-10-09 VITALS
OXYGEN SATURATION: 98 % | HEART RATE: 86 BPM | SYSTOLIC BLOOD PRESSURE: 132 MMHG | DIASTOLIC BLOOD PRESSURE: 88 MMHG | BODY MASS INDEX: 41.3 KG/M2 | HEIGHT: 71 IN | TEMPERATURE: 97.8 F | WEIGHT: 295 LBS | RESPIRATION RATE: 16 BRPM

## 2024-10-09 DIAGNOSIS — Z00.00 ENCOUNTER FOR GENERAL ADULT MEDICAL EXAMINATION W/OUT ABNORMAL FINDINGS: ICD-10-CM

## 2024-10-09 DIAGNOSIS — I10 ESSENTIAL (PRIMARY) HYPERTENSION: ICD-10-CM

## 2024-10-09 DIAGNOSIS — Z23 ENCOUNTER FOR IMMUNIZATION: ICD-10-CM

## 2024-10-09 DIAGNOSIS — D17.1 BENIGN LIPOMATOUS NEOPLASM OF SKIN AND SUBCUTANEOUS TISSUE OF TRUNK: ICD-10-CM

## 2024-10-09 DIAGNOSIS — E11.9 TYPE 2 DIABETES MELLITUS W/OUT COMPLICATIONS: ICD-10-CM

## 2024-10-09 DIAGNOSIS — J45.31 MILD PERSISTENT ASTHMA WITH (ACUTE) EXACERBATION: ICD-10-CM

## 2024-10-09 PROCEDURE — G0008: CPT

## 2024-10-09 PROCEDURE — 99396 PREV VISIT EST AGE 40-64: CPT | Mod: 25

## 2024-10-09 PROCEDURE — 90656 IIV3 VACC NO PRSV 0.5 ML IM: CPT

## 2024-10-09 RX ORDER — LOSARTAN POTASSIUM 25 MG/1
25 TABLET, FILM COATED ORAL DAILY
Qty: 3 | Refills: 1 | Status: ACTIVE | COMMUNITY
Start: 2024-10-09 | End: 1900-01-01

## 2024-10-10 RX ORDER — TEZEPELUMAB-EKKO 210 MG/1.9ML
210 INJECTION, SOLUTION SUBCUTANEOUS
Qty: 3 | Refills: 0 | Status: ACTIVE | COMMUNITY
Start: 2024-10-09 | End: 1900-01-01

## 2024-10-14 ENCOUNTER — OUTPATIENT (OUTPATIENT)
Dept: OUTPATIENT SERVICES | Facility: HOSPITAL | Age: 49
LOS: 1 days | End: 2024-10-14

## 2024-10-14 ENCOUNTER — APPOINTMENT (OUTPATIENT)
Dept: INTERNAL MEDICINE | Facility: CLINIC | Age: 49
End: 2024-10-14

## 2024-10-14 ENCOUNTER — NON-APPOINTMENT (OUTPATIENT)
Age: 49
End: 2024-10-14

## 2024-11-04 ENCOUNTER — APPOINTMENT (OUTPATIENT)
Dept: INTERNAL MEDICINE | Facility: CLINIC | Age: 49
End: 2024-11-04
Payer: MEDICAID

## 2024-11-04 ENCOUNTER — APPOINTMENT (OUTPATIENT)
Dept: GASTROENTEROLOGY | Facility: CLINIC | Age: 49
End: 2024-11-04
Payer: MEDICAID

## 2024-11-04 VITALS
BODY MASS INDEX: 41.44 KG/M2 | HEART RATE: 74 BPM | HEIGHT: 71 IN | OXYGEN SATURATION: 97 % | TEMPERATURE: 98.4 F | WEIGHT: 296 LBS | SYSTOLIC BLOOD PRESSURE: 150 MMHG | DIASTOLIC BLOOD PRESSURE: 94 MMHG

## 2024-11-04 VITALS
HEIGHT: 71 IN | WEIGHT: 296 LBS | BODY MASS INDEX: 41.44 KG/M2 | HEART RATE: 69 BPM | DIASTOLIC BLOOD PRESSURE: 93 MMHG | SYSTOLIC BLOOD PRESSURE: 150 MMHG

## 2024-11-04 DIAGNOSIS — M25.562 PAIN IN RIGHT KNEE: ICD-10-CM

## 2024-11-04 DIAGNOSIS — M25.561 PAIN IN RIGHT KNEE: ICD-10-CM

## 2024-11-04 DIAGNOSIS — Z12.11 ENCOUNTER FOR SCREENING FOR MALIGNANT NEOPLASM OF COLON: ICD-10-CM

## 2024-11-04 DIAGNOSIS — Z80.0 ENCOUNTER FOR SCREENING FOR MALIGNANT NEOPLASM OF COLON: ICD-10-CM

## 2024-11-04 PROCEDURE — 99214 OFFICE O/P EST MOD 30 MIN: CPT

## 2024-11-04 PROCEDURE — 99203 OFFICE O/P NEW LOW 30 MIN: CPT

## 2024-11-04 RX ORDER — SODIUM SULFATE, POTASSIUM SULFATE AND MAGNESIUM SULFATE 1.6; 3.13; 17.5 G/177ML; G/177ML; G/177ML
17.5-3.13-1.6 SOLUTION ORAL
Qty: 1 | Refills: 0 | Status: ACTIVE | COMMUNITY
Start: 2024-11-04 | End: 1900-01-01

## 2024-11-04 RX ORDER — DICLOFENAC SODIUM 10 MG/G
1 GEL TOPICAL
Qty: 1 | Refills: 0 | Status: ACTIVE | COMMUNITY
Start: 2024-11-04 | End: 1900-01-01

## 2024-11-04 RX ORDER — DICLOFENAC SODIUM 75 MG/1
75 TABLET, DELAYED RELEASE ORAL TWICE DAILY
Qty: 20 | Refills: 0 | Status: ACTIVE | COMMUNITY
Start: 2024-11-04 | End: 1900-01-01

## 2024-11-18 ENCOUNTER — APPOINTMENT (OUTPATIENT)
Age: 49
End: 2024-11-18
Payer: MEDICAID

## 2024-11-18 VITALS
BODY MASS INDEX: 38.78 KG/M2 | DIASTOLIC BLOOD PRESSURE: 82 MMHG | OXYGEN SATURATION: 96 % | WEIGHT: 277 LBS | SYSTOLIC BLOOD PRESSURE: 120 MMHG | HEIGHT: 71 IN | HEART RATE: 96 BPM

## 2024-11-18 DIAGNOSIS — Z82.5 FAMILY HISTORY OF ASTHMA AND OTHER CHRONIC LOWER RESPIRATORY DISEASES: ICD-10-CM

## 2024-11-18 DIAGNOSIS — M23.92 UNSPECIFIED INTERNAL DERANGEMENT OF LEFT KNEE: ICD-10-CM

## 2024-11-18 DIAGNOSIS — E78.00 PURE HYPERCHOLESTEROLEMIA, UNSPECIFIED: ICD-10-CM

## 2024-11-18 DIAGNOSIS — M23.91 UNSPECIFIED INTERNAL DERANGEMENT OF RIGHT KNEE: ICD-10-CM

## 2024-11-18 DIAGNOSIS — Z87.898 PERSONAL HISTORY OF OTHER SPECIFIED CONDITIONS: ICD-10-CM

## 2024-11-18 DIAGNOSIS — J45.909 UNSPECIFIED ASTHMA, UNCOMPLICATED: ICD-10-CM

## 2024-11-18 PROCEDURE — 73564 X-RAY EXAM KNEE 4 OR MORE: CPT | Mod: 50

## 2024-11-18 PROCEDURE — 99204 OFFICE O/P NEW MOD 45 MIN: CPT

## 2024-11-18 RX ORDER — DICLOFENAC SODIUM 75 MG/1
75 TABLET, DELAYED RELEASE ORAL
Qty: 28 | Refills: 2 | Status: ACTIVE | COMMUNITY
Start: 2024-11-18 | End: 1900-01-01

## 2024-12-09 ENCOUNTER — APPOINTMENT (OUTPATIENT)
Dept: MRI IMAGING | Facility: IMAGING CENTER | Age: 49
End: 2024-12-09

## 2024-12-14 ENCOUNTER — APPOINTMENT (OUTPATIENT)
Dept: MRI IMAGING | Facility: CLINIC | Age: 49
End: 2024-12-14
Payer: MEDICAID

## 2024-12-14 ENCOUNTER — RESULT REVIEW (OUTPATIENT)
Age: 49
End: 2024-12-14

## 2024-12-14 PROCEDURE — 73721 MRI JNT OF LWR EXTRE W/O DYE: CPT | Mod: LT,77

## 2024-12-14 PROCEDURE — 73721 MRI JNT OF LWR EXTRE W/O DYE: CPT | Mod: RT

## 2024-12-19 ENCOUNTER — APPOINTMENT (OUTPATIENT)
Age: 49
End: 2024-12-19
Payer: MEDICAID

## 2024-12-19 VITALS
HEIGHT: 71 IN | BODY MASS INDEX: 37.1 KG/M2 | SYSTOLIC BLOOD PRESSURE: 140 MMHG | HEART RATE: 87 BPM | DIASTOLIC BLOOD PRESSURE: 96 MMHG | OXYGEN SATURATION: 97 % | WEIGHT: 265 LBS

## 2024-12-19 DIAGNOSIS — M23.92 UNSPECIFIED INTERNAL DERANGEMENT OF LEFT KNEE: ICD-10-CM

## 2024-12-19 DIAGNOSIS — M23.91 UNSPECIFIED INTERNAL DERANGEMENT OF RIGHT KNEE: ICD-10-CM

## 2024-12-19 PROCEDURE — 20611 DRAIN/INJ JOINT/BURSA W/US: CPT | Mod: 50

## 2024-12-19 PROCEDURE — 99214 OFFICE O/P EST MOD 30 MIN: CPT | Mod: 25

## 2024-12-24 ENCOUNTER — RX RENEWAL (OUTPATIENT)
Age: 49
End: 2024-12-24

## 2025-01-09 ENCOUNTER — APPOINTMENT (OUTPATIENT)
Dept: INTERNAL MEDICINE | Facility: CLINIC | Age: 50
End: 2025-01-09

## 2025-01-21 ENCOUNTER — RX RENEWAL (OUTPATIENT)
Age: 50
End: 2025-01-21

## 2025-01-30 ENCOUNTER — NON-APPOINTMENT (OUTPATIENT)
Age: 50
End: 2025-01-30

## 2025-01-30 ENCOUNTER — APPOINTMENT (OUTPATIENT)
Dept: SURGERY | Facility: CLINIC | Age: 50
End: 2025-01-30
Payer: MEDICAID

## 2025-01-30 VITALS
OXYGEN SATURATION: 96 % | SYSTOLIC BLOOD PRESSURE: 140 MMHG | HEART RATE: 85 BPM | WEIGHT: 265 LBS | HEIGHT: 71 IN | BODY MASS INDEX: 37.1 KG/M2 | DIASTOLIC BLOOD PRESSURE: 88 MMHG

## 2025-01-30 DIAGNOSIS — J45.909 UNSPECIFIED ASTHMA, UNCOMPLICATED: ICD-10-CM

## 2025-01-30 DIAGNOSIS — M79.89 OTHER SPECIFIED SOFT TISSUE DISORDERS: ICD-10-CM

## 2025-01-30 DIAGNOSIS — Z56.0 UNEMPLOYMENT, UNSPECIFIED: ICD-10-CM

## 2025-01-30 DIAGNOSIS — Z87.898 PERSONAL HISTORY OF OTHER SPECIFIED CONDITIONS: ICD-10-CM

## 2025-01-30 DIAGNOSIS — E78.00 PURE HYPERCHOLESTEROLEMIA, UNSPECIFIED: ICD-10-CM

## 2025-01-30 DIAGNOSIS — Z82.5 FAMILY HISTORY OF ASTHMA AND OTHER CHRONIC LOWER RESPIRATORY DISEASES: ICD-10-CM

## 2025-01-30 PROCEDURE — 99203 OFFICE O/P NEW LOW 30 MIN: CPT

## 2025-01-30 SDOH — ECONOMIC STABILITY - INCOME SECURITY: UNEMPLOYMENT, UNSPECIFIED: Z56.0

## 2025-02-07 NOTE — ASU PATIENT PROFILE, ADULT - MENTAL HEALTH CONDITIONS/SYMPTOMS, PROFILE
[de-identified] : Patient is here for evaluation of left shoulder pain\par Affecting quality of life\par Has pain and weakness with loss of rom\par Wakes up at night due to pain\par \par NAD\par Left shoulder:\par TTP ant GH joint, bicipital groove\par FF 0-140 (passive 175)\par ER 40\par IR L5\par Pain with terminal rom\par Weakness to abduction and ER\par Pos Impingement\par Pos Dowling\par Pos Cross Arm Adduction\par Negative instability\par Positive scapula dyskinesia\par soft tissue swelling superior ac joint\par \par XRay Left shoulder negative for fracture, dislocation, pos for ac jt arthritis\par \par Plan\par went over findings\par explained the xray\par needs mri left shoulder to assess RC\par fu after mri
none

## 2025-02-07 NOTE — ASU PATIENT PROFILE, ADULT - NSICDXPASTMEDICALHX_GEN_ALL_CORE_FT
PAST MEDICAL HISTORY:  Hyperlipidemia     Hypertension     Moderate persistent asthma     Obesity     Prediabetes

## 2025-02-10 ENCOUNTER — APPOINTMENT (OUTPATIENT)
Dept: GASTROENTEROLOGY | Facility: HOSPITAL | Age: 50
End: 2025-02-10

## 2025-02-10 ENCOUNTER — OUTPATIENT (OUTPATIENT)
Dept: OUTPATIENT SERVICES | Facility: HOSPITAL | Age: 50
LOS: 1 days | Discharge: HOME | End: 2025-02-10
Payer: MEDICAID

## 2025-02-10 VITALS
HEIGHT: 71 IN | OXYGEN SATURATION: 97 % | WEIGHT: 216.05 LBS | HEART RATE: 84 BPM | TEMPERATURE: 98 F | SYSTOLIC BLOOD PRESSURE: 145 MMHG | RESPIRATION RATE: 17 BRPM | DIASTOLIC BLOOD PRESSURE: 94 MMHG

## 2025-02-10 VITALS
HEART RATE: 80 BPM | RESPIRATION RATE: 16 BRPM | DIASTOLIC BLOOD PRESSURE: 79 MMHG | SYSTOLIC BLOOD PRESSURE: 129 MMHG | OXYGEN SATURATION: 95 %

## 2025-02-10 DIAGNOSIS — Z12.11 ENCOUNTER FOR SCREENING FOR MALIGNANT NEOPLASM OF COLON: ICD-10-CM

## 2025-02-10 PROCEDURE — 45378 DIAGNOSTIC COLONOSCOPY: CPT | Mod: 33

## 2025-02-10 NOTE — PRE PROCEDURE NOTE - PRE PROCEDURE EVALUATION
Attending Physician:  Arthur Carlos MD    Indication for Procedure: screening  50 yo Male presenting for screening colonoscopy.  He has FH of colon cancer.  He denies any current complaints.      PAST MEDICAL & SURGICAL HISTORY:  Moderate persistent asthma  Obesity  Prediabetes  Hyperlipidemia  Hypertension    No significant past surgical history      See Allscripts Note for further details  ALLERGIES:  beans (Anaphylaxis)  No Known Drug Allergies  Peaches (Short breath)    HOME MEDICATIONS:  albuterol 90 mcg/inh inhalation aerosol: 2 puff(s) inhaled every 6 hours, As Needed  atorvastatin 20 mg oral tablet: 1 tab(s) orally once a day  Cozaar 50 mg oral tablet: 1 tab(s) orally once a day  diclofenac sodium 75 mg oral delayed release tablet: 1 tab(s) orally prn  Norvasc 5 mg oral tablet: 1 tab(s) orally once a day      See Allscripts Note for further details    AICD/PPM: [ ] yes   [X] no    PERTINENT LAB DATA:                      PHYSICAL EXAMINATION:    Height (cm): 180.3  Weight (kg): 98  BMI (kg/m2): 30.1  BSA (m2): 2.18T(C): 36.4  HR: 84  BP: 145/94  RR: 17  SpO2: 97%    Constitutional: NAD  HEENT: NCAT  NECk: supple  Respiratory: CTA BL  Cardiovascular: +S1/S2  Gastrointestinal: BS+, soft  Extremities: no edema  Neurological: A/O x 3  Psychiatric: Normal mood, normal affect  Skin: No rashes    ASA Class: I [ ]  II [ ]  III [X]  IV [ ]    COMMENTS:    The patient is a suitable candidate for the planned procedure unless box checked [ ]  No, explain:

## 2025-02-11 ENCOUNTER — RX RENEWAL (OUTPATIENT)
Age: 50
End: 2025-02-11

## 2025-02-18 ENCOUNTER — RX RENEWAL (OUTPATIENT)
Age: 50
End: 2025-02-18

## 2025-02-27 PROBLEM — E78.5 HYPERLIPIDEMIA, UNSPECIFIED: Chronic | Status: ACTIVE | Noted: 2025-02-10

## 2025-02-27 PROBLEM — I10 ESSENTIAL (PRIMARY) HYPERTENSION: Chronic | Status: ACTIVE | Noted: 2025-02-10

## 2025-02-28 ENCOUNTER — OUTPATIENT (OUTPATIENT)
Dept: OUTPATIENT SERVICES | Facility: HOSPITAL | Age: 50
LOS: 1 days | End: 2025-02-28
Payer: COMMERCIAL

## 2025-02-28 VITALS
HEIGHT: 71 IN | WEIGHT: 259.93 LBS | OXYGEN SATURATION: 97 % | TEMPERATURE: 98 F | SYSTOLIC BLOOD PRESSURE: 124 MMHG | RESPIRATION RATE: 15 BRPM | HEART RATE: 66 BPM | DIASTOLIC BLOOD PRESSURE: 71 MMHG

## 2025-02-28 DIAGNOSIS — E78.5 HYPERLIPIDEMIA, UNSPECIFIED: ICD-10-CM

## 2025-02-28 DIAGNOSIS — Z01.818 ENCOUNTER FOR OTHER PREPROCEDURAL EXAMINATION: ICD-10-CM

## 2025-02-28 DIAGNOSIS — M79.89 OTHER SPECIFIED SOFT TISSUE DISORDERS: ICD-10-CM

## 2025-02-28 DIAGNOSIS — I10 ESSENTIAL (PRIMARY) HYPERTENSION: ICD-10-CM

## 2025-02-28 DIAGNOSIS — Z91.89 OTHER SPECIFIED PERSONAL RISK FACTORS, NOT ELSEWHERE CLASSIFIED: ICD-10-CM

## 2025-02-28 DIAGNOSIS — Z29.9 ENCOUNTER FOR PROPHYLACTIC MEASURES, UNSPECIFIED: ICD-10-CM

## 2025-02-28 DIAGNOSIS — J45.909 UNSPECIFIED ASTHMA, UNCOMPLICATED: ICD-10-CM

## 2025-02-28 LAB
A1C WITH ESTIMATED AVERAGE GLUCOSE RESULT: 6.1 % — HIGH (ref 4–5.6)
ANION GAP SERPL CALC-SCNC: 6 MMOL/L — SIGNIFICANT CHANGE UP (ref 5–17)
BUN SERPL-MCNC: 12 MG/DL — SIGNIFICANT CHANGE UP (ref 7–18)
CALCIUM SERPL-MCNC: 9 MG/DL — SIGNIFICANT CHANGE UP (ref 8.4–10.5)
CHLORIDE SERPL-SCNC: 111 MMOL/L — HIGH (ref 96–108)
CO2 SERPL-SCNC: 26 MMOL/L — SIGNIFICANT CHANGE UP (ref 22–31)
CREAT SERPL-MCNC: 0.79 MG/DL — SIGNIFICANT CHANGE UP (ref 0.5–1.3)
EGFR: 110 ML/MIN/1.73M2 — SIGNIFICANT CHANGE UP
ESTIMATED AVERAGE GLUCOSE: 128 MG/DL — HIGH (ref 68–114)
GLUCOSE SERPL-MCNC: 119 MG/DL — HIGH (ref 70–99)
HCT VFR BLD CALC: 42.3 % — SIGNIFICANT CHANGE UP (ref 39–50)
HGB BLD-MCNC: 13.8 G/DL — SIGNIFICANT CHANGE UP (ref 13–17)
MCHC RBC-ENTMCNC: 29.9 PG — SIGNIFICANT CHANGE UP (ref 27–34)
MCHC RBC-ENTMCNC: 32.6 G/DL — SIGNIFICANT CHANGE UP (ref 32–36)
MCV RBC AUTO: 91.8 FL — SIGNIFICANT CHANGE UP (ref 80–100)
NRBC BLD AUTO-RTO: 0 /100 WBCS — SIGNIFICANT CHANGE UP (ref 0–0)
PLATELET # BLD AUTO: 224 K/UL — SIGNIFICANT CHANGE UP (ref 150–400)
POTASSIUM SERPL-MCNC: 4.4 MMOL/L — SIGNIFICANT CHANGE UP (ref 3.5–5.3)
POTASSIUM SERPL-SCNC: 4.4 MMOL/L — SIGNIFICANT CHANGE UP (ref 3.5–5.3)
RBC # BLD: 4.61 M/UL — SIGNIFICANT CHANGE UP (ref 4.2–5.8)
RBC # FLD: 14.1 % — SIGNIFICANT CHANGE UP (ref 10.3–14.5)
SODIUM SERPL-SCNC: 143 MMOL/L — SIGNIFICANT CHANGE UP (ref 135–145)
WBC # BLD: 6.94 K/UL — SIGNIFICANT CHANGE UP (ref 3.8–10.5)
WBC # FLD AUTO: 6.94 K/UL — SIGNIFICANT CHANGE UP (ref 3.8–10.5)

## 2025-02-28 NOTE — H&P PST ADULT - GENITOURINARY FEMALE
Discussed case with Dr. Guerin.  Favor EBUS.    If nondiagnostic, mediastinoscopy could be considered.  Will request pulmonary consultation.  
none
not applicable

## 2025-02-28 NOTE — H&P PST ADULT - ASSESSMENT
48 yo male is scheduled for :   Excision of Left Upper Back mass, on 3/7/25      CAPRINI SCORE    AGE RELATED RISK FACTORS                                                             [x ] Age 41-60 years                                            (1 Point)  [ ] Age: 61-74 years                                           (2 Points)                 [ ] Age= 75 years                                                (3 Points)             DISEASE RELATED RISK FACTORS                                                       [ ] Edema in the lower extremities                 (1 Point)                     [ ] Varicose veins                                               (1 Point)                                 [ x] BMI > 25 Kg/m2                                            (1 Point)                                  [ ] Serious infection (ie PNA)                            (1 Point)                     [ ] Lung disease ( COPD, Emphysema)            (1 Point)                                                                          [ ] Acute myocardial infarction                         (1 Point)                  [ ] Congestive heart failure (in the previous month)  (1 Point)         [ ] Inflammatory bowel disease                            (1 Point)                  [ ] Central venous access, PICC or Port               (2 points)       (within the last month)                                                                [ ] Stroke (in the previous month)                        (5 Points)    [ ] Previous or present malignancy                       (2 points)                                                                                                                                                         HEMATOLOGY RELATED FACTORS                                                         [ ] Prior episodes of VTE                                     (3 Points)                     [ ] Positive family history for VTE                      (3 Points)                  [ ] Prothrombin 56021 A                                     (3 Points)                     [ ] Factor V Leiden                                                (3 Points)                        [ ] Lupus anticoagulants                                      (3 Points)                                                           [ ] Anticardiolipin antibodies                              (3 Points)                                                       [ ] High homocysteine in the blood                   (3 Points)                                             [ ] Other congenital or acquired thrombophilia      (3 Points)                                                [ ] Heparin induced thrombocytopenia                  (3 Points)                                        MOBILITY RELATED FACTORS  [ ] Bed rest                                                         (1 Point)  [ ] Plaster cast                                                    (2 points)  [ ] Bed bound for more than 72 hours           (2 Points)    GENDER SPECIFIC FACTORS  [ ] Pregnancy or had a baby within the last month   (1 Point)  [ ] Post-partum < 6 weeks                                   (1 Point)  [ ] Hormonal therapy  or oral contraception   (1 Point)  [ ] History of pregnancy complications              (1 point)  [ ] Unexplained or recurrent              (1 Point)    OTHER RISK FACTORS                                           (1 Point)  [ ] BMI >40, smoking, diabetes requiring insulin, chemotherapy  blood transfusions and length of surgery over 2 hours    SURGERY RELATED RISK FACTORS  [ ]  Section within the last month     (1 Point)  [x ] Minor surgery                                                  (1 Point)  [ ] Arthroscopic surgery                                       (2 Points)  [ ] Planned major surgery lasting more            (2 Points)      than 45 minutes     [ ] Elective hip or knee joint replacement       (5 points)       surgery                                                TRAUMA RELATED RISK FACTORS  [ ] Fracture of the hip, pelvis, or leg                       (5 Points)  [ ] Spinal cord injury resulting in paralysis             (5 points)       (in the previous month)    [ ] Paralysis  (less than 1 month)                             (5 Points)  [ ] Multiple Trauma within 1 month                        (5 Points)    Total Score [ 3 ]    Caprini Score 0-2: Low Risk, mechanical prophylaxis (ie:compression devices)  Caprini Score 3-6: Moderate Risk , pharmacologic VTE prophylaxis is indicated for most patients (in the absence of contraindications)  Caprini Score Greater than or =7: High risk, pharmocologic VTE prophylaxis indicated for most patients (in the absence of contraindications)

## 2025-02-28 NOTE — H&P PST ADULT - HISTORY OF PRESENT ILLNESS
48 yo male with history of Obesity, HTN, HLD, Asthma (no recent hospitalization, no intubation), Prediabetes, reports the above.  Patient states has had the mas for approximately two years, want to remove it because "all of a sudden it becomes big:.  He is scheduled for :  Excision of Left Upper Back mass, on 3/7/25

## 2025-02-28 NOTE — H&P PST ADULT - NSICDXFAMILYHX_GEN_ALL_CORE_FT
FAMILY HISTORY:  Father  Still living? No  FH: colon cancer, Age at diagnosis: Age Unknown    Mother  Still living? Yes, Estimated age: Age Unknown  FH: asthma, Age at diagnosis: Age Unknown  FH: hypertension, Age at diagnosis: Age Unknown    Grandparent  Still living? Yes, Estimated age: Age Unknown  FH: hypertension, Age at diagnosis: Age Unknown

## 2025-03-03 PROCEDURE — 85027 COMPLETE CBC AUTOMATED: CPT

## 2025-03-03 PROCEDURE — 93005 ELECTROCARDIOGRAM TRACING: CPT

## 2025-03-03 PROCEDURE — G0463: CPT

## 2025-03-03 PROCEDURE — 83036 HEMOGLOBIN GLYCOSYLATED A1C: CPT

## 2025-03-03 PROCEDURE — 80048 BASIC METABOLIC PNL TOTAL CA: CPT

## 2025-03-03 PROCEDURE — 36415 COLL VENOUS BLD VENIPUNCTURE: CPT

## 2025-03-07 ENCOUNTER — OUTPATIENT (OUTPATIENT)
Dept: OUTPATIENT SERVICES | Facility: HOSPITAL | Age: 50
LOS: 1 days | End: 2025-03-07
Payer: COMMERCIAL

## 2025-03-07 ENCOUNTER — APPOINTMENT (OUTPATIENT)
Dept: SURGERY | Facility: HOSPITAL | Age: 50
End: 2025-03-07

## 2025-03-07 VITALS
TEMPERATURE: 98 F | SYSTOLIC BLOOD PRESSURE: 114 MMHG | RESPIRATION RATE: 16 BRPM | DIASTOLIC BLOOD PRESSURE: 76 MMHG | HEART RATE: 66 BPM | OXYGEN SATURATION: 96 %

## 2025-03-07 VITALS
WEIGHT: 259.93 LBS | RESPIRATION RATE: 16 BRPM | OXYGEN SATURATION: 97 % | HEIGHT: 71 IN | HEART RATE: 65 BPM | SYSTOLIC BLOOD PRESSURE: 134 MMHG | TEMPERATURE: 98 F | DIASTOLIC BLOOD PRESSURE: 81 MMHG

## 2025-03-07 DIAGNOSIS — M79.89 OTHER SPECIFIED SOFT TISSUE DISORDERS: ICD-10-CM

## 2025-03-07 DIAGNOSIS — Z01.818 ENCOUNTER FOR OTHER PREPROCEDURAL EXAMINATION: ICD-10-CM

## 2025-03-07 PROCEDURE — 88305 TISSUE EXAM BY PATHOLOGIST: CPT | Mod: 26

## 2025-03-07 PROCEDURE — 21933 EXC BACK TUM DEEP 5 CM/>: CPT | Mod: AS

## 2025-03-07 PROCEDURE — 21933 EXC BACK TUM DEEP 5 CM/>: CPT

## 2025-03-07 PROCEDURE — 88305 TISSUE EXAM BY PATHOLOGIST: CPT

## 2025-03-07 PROCEDURE — 11200 RMVL SKIN TAGS UP TO&INC 15: CPT | Mod: XS

## 2025-03-07 PROCEDURE — 88307 TISSUE EXAM BY PATHOLOGIST: CPT

## 2025-03-07 PROCEDURE — 88307 TISSUE EXAM BY PATHOLOGIST: CPT | Mod: 26

## 2025-03-07 RX ORDER — AMLODIPINE BESYLATE 5 MG
1 TABLET ORAL
Refills: 0 | DISCHARGE

## 2025-03-07 RX ORDER — FENTANYL CITRATE-0.9 % NACL/PF 100MCG/2ML
25 SYRINGE (ML) INTRAVENOUS
Refills: 0 | Status: DISCONTINUED | OUTPATIENT
Start: 2025-03-07 | End: 2025-03-07

## 2025-03-07 RX ORDER — FENTANYL CITRATE-0.9 % NACL/PF 100MCG/2ML
50 SYRINGE (ML) INTRAVENOUS
Refills: 0 | Status: DISCONTINUED | OUTPATIENT
Start: 2025-03-07 | End: 2025-03-07

## 2025-03-07 RX ORDER — ATORVASTATIN CALCIUM 80 MG/1
1 TABLET, FILM COATED ORAL
Refills: 0 | DISCHARGE

## 2025-03-07 RX ORDER — LOSARTAN POTASSIUM 100 MG
1 TABLET ORAL
Refills: 0 | DISCHARGE

## 2025-03-07 RX ORDER — ONDANSETRON HCL/PF 4 MG/2 ML
4 VIAL (ML) INJECTION ONCE
Refills: 0 | Status: DISCONTINUED | OUTPATIENT
Start: 2025-03-07 | End: 2025-03-07

## 2025-03-07 RX ORDER — HEPARIN SODIUM 1000 [USP'U]/ML
5000 INJECTION INTRAVENOUS; SUBCUTANEOUS ONCE
Refills: 0 | Status: COMPLETED | OUTPATIENT
Start: 2025-03-07 | End: 2025-03-07

## 2025-03-07 RX ORDER — DICLOFENAC SODIUM 75 MG/1
1 TABLET, DELAYED RELEASE ORAL
Refills: 0 | DISCHARGE

## 2025-03-07 RX ORDER — SODIUM CHLORIDE 9 G/1000ML
1000 INJECTION, SOLUTION INTRAVENOUS
Refills: 0 | Status: DISCONTINUED | OUTPATIENT
Start: 2025-03-07 | End: 2025-03-07

## 2025-03-07 NOTE — ASU DISCHARGE PLAN (ADULT/PEDIATRIC) - FINANCIAL ASSISTANCE
[facility] provides services at a reduced cost to those who are determined to be eligible through [facility]’s financial assistance program. Information regarding [facility]’s financial assistance program can be found by going to [email or web address] or by calling [phone].

## 2025-03-07 NOTE — ASU DISCHARGE PLAN (ADULT/PEDIATRIC) - FOLLOW UP APPOINTMENTS
[facility], Same Day Surgery or call 911/[facility], Same Day Surgery

## 2025-03-07 NOTE — ASU DISCHARGE PLAN (ADULT/PEDIATRIC) - CARE PROVIDER_API CALL
Sourav Staley  Surgery  [address]  [place] [address]  Phone: [phone]  Fax: [phone]  Follow Up Time:

## 2025-03-19 ENCOUNTER — APPOINTMENT (OUTPATIENT)
Age: 50
End: 2025-03-19
Payer: MEDICAID

## 2025-03-19 VITALS
HEART RATE: 70 BPM | SYSTOLIC BLOOD PRESSURE: 129 MMHG | DIASTOLIC BLOOD PRESSURE: 84 MMHG | OXYGEN SATURATION: 95 % | HEIGHT: 71 IN | WEIGHT: 260 LBS | BODY MASS INDEX: 36.4 KG/M2

## 2025-03-19 DIAGNOSIS — M23.91 UNSPECIFIED INTERNAL DERANGEMENT OF RIGHT KNEE: ICD-10-CM

## 2025-03-19 DIAGNOSIS — M23.92 UNSPECIFIED INTERNAL DERANGEMENT OF LEFT KNEE: ICD-10-CM

## 2025-03-19 PROCEDURE — 20611 DRAIN/INJ JOINT/BURSA W/US: CPT | Mod: 50

## 2025-03-19 PROCEDURE — 99214 OFFICE O/P EST MOD 30 MIN: CPT | Mod: 25

## 2025-03-19 PROCEDURE — A4649 SURGICAL SUPPLIES: CPT

## 2025-03-19 RX ORDER — CYCLOBENZAPRINE HYDROCHLORIDE 10 MG/1
10 TABLET, FILM COATED ORAL
Qty: 30 | Refills: 1 | Status: ACTIVE | COMMUNITY
Start: 2025-03-19 | End: 1900-01-01

## 2025-03-24 LAB — SURGICAL PATHOLOGY STUDY: SIGNIFICANT CHANGE UP

## 2025-04-10 ENCOUNTER — APPOINTMENT (OUTPATIENT)
Dept: INTERNAL MEDICINE | Facility: CLINIC | Age: 50
End: 2025-04-10

## 2025-04-10 ENCOUNTER — OUTPATIENT (OUTPATIENT)
Dept: OUTPATIENT SERVICES | Facility: HOSPITAL | Age: 50
LOS: 1 days | End: 2025-04-10

## 2025-04-10 ENCOUNTER — NON-APPOINTMENT (OUTPATIENT)
Age: 50
End: 2025-04-10

## 2025-06-19 ENCOUNTER — RX RENEWAL (OUTPATIENT)
Age: 50
End: 2025-06-19

## 2025-07-01 NOTE — ED ADULT NURSE NOTE - NS ED PATIENT SAFETY CONCERN
Dr. Hamilton - please review [employer] message and advise. Please respond directly to the patient if no additional staff support is required.     No

## 2025-07-23 ENCOUNTER — RX RENEWAL (OUTPATIENT)
Age: 50
End: 2025-07-23

## (undated) DEVICE — GOWN XL

## (undated) DEVICE — GOWN LG

## (undated) DEVICE — SUT POLYSORB 4-0 27" P-12 UNDYED

## (undated) DEVICE — CATH IV SAFE BC 22G X 1" (BLUE)

## (undated) DEVICE — DRSG MASTISOL

## (undated) DEVICE — TUBING SUCTION NONCONDUCTIVE 6MM X 12FT

## (undated) DEVICE — LUBRICATING JELLY HR ONE SHOT 3G

## (undated) DEVICE — SALIVA EJECTOR (BLUE)

## (undated) DEVICE — BASIN EMESIS 10IN GRADUATED MAUVE

## (undated) DEVICE — ELCTR GROUNDING PAD ADULT COVIDIEN

## (undated) DEVICE — WARMING BLANKET LOWER ADULT

## (undated) DEVICE — SUT POLYSORB 3-0 30" V-20 UNDYED

## (undated) DEVICE — BIOPSY FORCEP COLD DISP

## (undated) DEVICE — SOL IRR POUR H2O 500ML

## (undated) DEVICE — TUBING MEDI-VAC W MAXIGRIP CONNECTORS 1/4"X6'

## (undated) DEVICE — SOL IRR POUR NS 0.9% 1500ML

## (undated) DEVICE — FOR-ESU VALLEYLAB T7E15008DX: Type: DURABLE MEDICAL EQUIPMENT

## (undated) DEVICE — DRAPE LIGHT HANDLE COVER (BLUE)

## (undated) DEVICE — DRSG 2X2

## (undated) DEVICE — BIOPSY FORCEP RADIAL JAW 4 STANDARD WITH NEEDLE

## (undated) DEVICE — DRAPE LAPAROTOMY TRANSVERSE

## (undated) DEVICE — DRAPE 1/2 SHEET 40X57"

## (undated) DEVICE — GLV 7 PROTEXIS (WHITE)

## (undated) DEVICE — DRSG TEGADERM 4 X 4.75"

## (undated) DEVICE — PACK MINOR NO DRAPE

## (undated) DEVICE — ELCTR ECG CONDUCTIVE ADHESIVE

## (undated) DEVICE — DRSG CURITY GAUZE SPONGE 4 X 4" 12-PLY

## (undated) DEVICE — NDL HYPO SAFE 25G X 1.5" (ORANGE)

## (undated) DEVICE — GLV 7.5 PROTEXIS (WHITE)

## (undated) DEVICE — CONTAINER FORMALIN 10% 20ML

## (undated) DEVICE — DRSG BANDAID 0.75X3"

## (undated) DEVICE — DRSG CURITY GAUZE SPONGE 4 X 4" 12-PLY NON-STERILE

## (undated) DEVICE — TUBING IV SET GRAVITY 3Y 100" MACRO

## (undated) DEVICE — VENODYNE/SCD SLEEVE CALF MEDIUM

## (undated) DEVICE — CONTAINER FORMALIN 80ML YELLOW

## (undated) DEVICE — PACK IV START WITH CHG